# Patient Record
Sex: MALE | Race: BLACK OR AFRICAN AMERICAN | NOT HISPANIC OR LATINO | Employment: UNEMPLOYED | URBAN - METROPOLITAN AREA
[De-identification: names, ages, dates, MRNs, and addresses within clinical notes are randomized per-mention and may not be internally consistent; named-entity substitution may affect disease eponyms.]

---

## 2020-07-14 ENCOUNTER — LAB VISIT (OUTPATIENT)
Dept: PRIMARY CARE CLINIC | Facility: OTHER | Age: 60
End: 2020-07-14
Attending: INTERNAL MEDICINE
Payer: MEDICARE

## 2020-07-14 DIAGNOSIS — Z03.818 ENCOUNTER FOR OBSERVATION FOR SUSPECTED EXPOSURE TO OTHER BIOLOGICAL AGENTS RULED OUT: ICD-10-CM

## 2020-07-14 PROCEDURE — U0003 INFECTIOUS AGENT DETECTION BY NUCLEIC ACID (DNA OR RNA); SEVERE ACUTE RESPIRATORY SYNDROME CORONAVIRUS 2 (SARS-COV-2) (CORONAVIRUS DISEASE [COVID-19]), AMPLIFIED PROBE TECHNIQUE, MAKING USE OF HIGH THROUGHPUT TECHNOLOGIES AS DESCRIBED BY CMS-2020-01-R: HCPCS

## 2020-07-16 ENCOUNTER — HOSPITAL ENCOUNTER (EMERGENCY)
Facility: HOSPITAL | Age: 60
Discharge: HOME | End: 2020-07-17
Attending: EMERGENCY MEDICINE
Payer: MEDICARE

## 2020-07-16 DIAGNOSIS — R05.9 COUGH: ICD-10-CM

## 2020-07-16 DIAGNOSIS — Z20.822 EXPOSURE TO COVID-19 VIRUS: Primary | ICD-10-CM

## 2020-07-16 PROCEDURE — 99283 EMERGENCY DEPT VISIT LOW MDM: CPT | Mod: 25

## 2020-07-16 PROCEDURE — U0002 COVID-19 LAB TEST NON-CDC: HCPCS

## 2020-07-17 VITALS
SYSTOLIC BLOOD PRESSURE: 144 MMHG | HEART RATE: 69 BPM | HEIGHT: 65 IN | BODY MASS INDEX: 27.32 KG/M2 | RESPIRATION RATE: 12 BRPM | WEIGHT: 164 LBS | OXYGEN SATURATION: 99 % | TEMPERATURE: 99 F | DIASTOLIC BLOOD PRESSURE: 82 MMHG

## 2020-07-17 DIAGNOSIS — U07.1 COVID-19 VIRUS DETECTED: ICD-10-CM

## 2020-07-17 LAB — SARS-COV-2 RDRP RESP QL NAA+PROBE: POSITIVE

## 2020-07-17 NOTE — ED PROVIDER NOTES
Encounter Date: 7/16/2020       History     Chief Complaint   Patient presents with    Cough     sent from group home. had positive COVID exposure. had test 2 days ago, pending results. No distress noted to pt.      Ernie Davila, a 59 y.o. male  has a past medical history of Down syndrome, Hypothyroidism, Mental retardation, Multinodular goiter, and Myopia with astigmatism.     He presents to the ED evaluation of increased frequency of cough over the last 2 - 3 days.  Has been exposed to COVID positive patient at group home.  Spoke with nurse on staff and stated that he was noticed to have and increased cough and 1 episode of sweating.  Denies abnormal vitals, specifically of fever and decreased SPO2.        The history is provided by a caregiver.     Review of patient's allergies indicates:  No Known Allergies  Past Medical History:   Diagnosis Date    Down syndrome     Hypothyroidism     Mental retardation     Multinodular goiter     Myopia with astigmatism      History reviewed. No pertinent surgical history.  History reviewed. No pertinent family history.  Social History     Tobacco Use    Smoking status: Never Smoker   Substance Use Topics    Alcohol use: No    Drug use: No     Review of Systems   Unable to perform ROS: Other (baseline MR)       Physical Exam     Initial Vitals [07/16/20 2123]   BP Pulse Resp Temp SpO2   135/72 68 18 98.3 °F (36.8 °C) 100 %      MAP       --         Physical Exam    Nursing note and vitals reviewed.  Constitutional: He appears well-developed and well-nourished.   HENT:   Head: Normocephalic and atraumatic.   Right Ear: External ear normal.   Left Ear: External ear normal.   Nose: Nose normal.   Mouth/Throat: Oropharynx is clear and moist.   Eyes: EOM are normal.   Neck: Normal range of motion. Neck supple.   Cardiovascular: Normal rate and regular rhythm.   Pulmonary/Chest: Breath sounds normal. No respiratory distress.   Abdominal: Soft. He exhibits no distension.  There is no abdominal tenderness. There is no rebound.   Musculoskeletal: Normal range of motion. No tenderness or edema.   Lymphadenopathy:     He has no cervical adenopathy.   Neurological: He is alert and oriented to person, place, and time. No cranial nerve deficit.   Skin: Skin is warm and dry. No rash and no abscess noted. No erythema.   Psychiatric: He has a normal mood and affect. Thought content normal.         ED Course   Procedures  Labs Reviewed   SARS-COV-2 RNA AMPLIFICATION, QUAL - Abnormal; Notable for the following components:       Result Value    SARS-CoV-2 RNA, Amplification, Qual Positive (*)     All other components within normal limits    Narrative:      COVID  critical result(s) called and verbal readback obtained from   Sho FANG  by AM1 07/17/2020 00:10          Imaging Results          X-Ray Chest AP Portable (Final result)  Result time 07/16/20 22:31:52    Final result by Shaina Wilburn MD (07/16/20 22:31:52)                 Impression:      No acute cardiopulmonary process identified.      Electronically signed by: Shaina Wilburn MD  Date:    07/16/2020  Time:    22:31             Narrative:    EXAMINATION:  XR CHEST AP PORTABLE    CLINICAL HISTORY:  Cough    TECHNIQUE:  Single frontal view of the chest was performed.    COMPARISON:  September 2014.    FINDINGS:  Cardiac silhouette is normal in size.  Lungs are symmetrically expanded.  No evidence of focal consolidative process, pneumothorax, or significant pleural effusion.  No acute osseous abnormality identified.                                 Medical Decision Making:   Initial Assessment:   Increased frequency of cough after exposure to COVID positive patient, COVID test pending   Differential Diagnosis:   COVID, PNA, bronchitis   Clinical Tests:   Radiological Study: Ordered and Reviewed  ED Management:  Pt presents to ED for evaluation of increased frequency of cough after exposure to COVID positive patient.  He is able to answer  some yes or no questions, however, given his baseline MR status, his history is limited.  Chest x-ray today shows no acute abnormality.  Transportation was difficult to normal range without COVID status therefore rapid COVID was ordered and he was found to be positive.  He will be transferred back via ambulance to his group home.  Return precautions were given to accepting nurse.                                 Clinical Impression:       ICD-10-CM ICD-9-CM   1. Exposure to Covid-19 Virus  Z20.828    2. Cough  R05 786.2                                Christie Randall PA-C  07/17/20 1734

## 2020-07-17 NOTE — ED NOTES
Unable to obtain appropriate transportation due to pending COVID test vs [facility].  Rapid test order per Dr. Sears.

## 2020-07-18 LAB — SARS-COV-2 RNA RESP QL NAA+PROBE: NOT DETECTED

## 2020-08-04 ENCOUNTER — HOSPITAL ENCOUNTER (INPATIENT)
Facility: HOSPITAL | Age: 60
LOS: 6 days | Discharge: HOME | DRG: 176 | End: 2020-08-10
Attending: EMERGENCY MEDICINE | Admitting: HOSPITALIST
Payer: MEDICARE

## 2020-08-04 DIAGNOSIS — R07.9 CHEST PAIN: ICD-10-CM

## 2020-08-04 DIAGNOSIS — I26.99 PULMONARY EMBOLISM, UNSPECIFIED CHRONICITY, UNSPECIFIED PULMONARY EMBOLISM TYPE, UNSPECIFIED WHETHER ACUTE COR PULMONALE PRESENT: Primary | ICD-10-CM

## 2020-08-04 DIAGNOSIS — R33.9 URINARY RETENTION: ICD-10-CM

## 2020-08-04 PROBLEM — E89.0 POSTABLATIVE HYPOTHYROIDISM: Status: ACTIVE | Noted: 2020-08-04

## 2020-08-04 PROBLEM — U07.1 COVID-19 VIRUS DETECTED: Status: ACTIVE | Noted: 2020-08-04

## 2020-08-04 PROBLEM — R91.8 MULTILOBAR LUNG INFILTRATE: Status: ACTIVE | Noted: 2020-08-04

## 2020-08-04 LAB
ALBUMIN SERPL BCP-MCNC: 2.8 G/DL (ref 3.5–5.2)
ALP SERPL-CCNC: 47 U/L (ref 55–135)
ALT SERPL W/O P-5'-P-CCNC: 16 U/L (ref 10–44)
ANION GAP SERPL CALC-SCNC: 11 MMOL/L (ref 8–16)
AST SERPL-CCNC: 29 U/L (ref 10–40)
BASOPHILS # BLD AUTO: 0.04 K/UL (ref 0–0.2)
BASOPHILS NFR BLD: 0.3 % (ref 0–1.9)
BILIRUB SERPL-MCNC: 0.7 MG/DL (ref 0.1–1)
BNP SERPL-MCNC: <10 PG/ML (ref 0–99)
BUN SERPL-MCNC: 11 MG/DL (ref 8–23)
CALCIUM SERPL-MCNC: 8.4 MG/DL (ref 8.7–10.5)
CHLORIDE SERPL-SCNC: 104 MMOL/L (ref 95–110)
CK SERPL-CCNC: 39 U/L (ref 20–200)
CO2 SERPL-SCNC: 27 MMOL/L (ref 23–29)
CREAT SERPL-MCNC: 0.9 MG/DL (ref 0.5–1.4)
CRP SERPL-MCNC: 156.4 MG/L (ref 0–8.2)
D DIMER PPP IA.FEU-MCNC: 9.36 MG/L FEU
DIFFERENTIAL METHOD: ABNORMAL
EOSINOPHIL # BLD AUTO: 0 K/UL (ref 0–0.5)
EOSINOPHIL NFR BLD: 0 % (ref 0–8)
ERYTHROCYTE [DISTWIDTH] IN BLOOD BY AUTOMATED COUNT: 14.6 % (ref 11.5–14.5)
EST. GFR  (AFRICAN AMERICAN): >60 ML/MIN/1.73 M^2
EST. GFR  (NON AFRICAN AMERICAN): >60 ML/MIN/1.73 M^2
FERRITIN SERPL-MCNC: 1931 NG/ML (ref 20–300)
GLUCOSE SERPL-MCNC: 94 MG/DL (ref 70–110)
HCT VFR BLD AUTO: 43 % (ref 40–54)
HGB BLD-MCNC: 14.5 G/DL (ref 14–18)
IMM GRANULOCYTES # BLD AUTO: 0.12 K/UL (ref 0–0.04)
IMM GRANULOCYTES NFR BLD AUTO: 0.9 % (ref 0–0.5)
LACTATE SERPL-SCNC: 1.2 MMOL/L (ref 0.5–2.2)
LDH SERPL L TO P-CCNC: 540 U/L (ref 110–260)
LYMPHOCYTES # BLD AUTO: 1 K/UL (ref 1–4.8)
LYMPHOCYTES NFR BLD: 7.6 % (ref 18–48)
MCH RBC QN AUTO: 32.2 PG (ref 27–31)
MCHC RBC AUTO-ENTMCNC: 33.7 G/DL (ref 32–36)
MCV RBC AUTO: 96 FL (ref 82–98)
MONOCYTES # BLD AUTO: 1.8 K/UL (ref 0.3–1)
MONOCYTES NFR BLD: 13.2 % (ref 4–15)
NEUTROPHILS # BLD AUTO: 10.3 K/UL (ref 1.8–7.7)
NEUTROPHILS NFR BLD: 78 % (ref 38–73)
NRBC BLD-RTO: 0 /100 WBC
PLATELET # BLD AUTO: 178 K/UL (ref 150–350)
PMV BLD AUTO: 10.7 FL (ref 9.2–12.9)
POTASSIUM SERPL-SCNC: 4.8 MMOL/L (ref 3.5–5.1)
PROCALCITONIN SERPL IA-MCNC: 0.05 NG/ML
PROT SERPL-MCNC: 7.9 G/DL (ref 6–8.4)
RBC # BLD AUTO: 4.5 M/UL (ref 4.6–6.2)
SODIUM SERPL-SCNC: 142 MMOL/L (ref 136–145)
TROPONIN I SERPL DL<=0.01 NG/ML-MCNC: <0.006 NG/ML (ref 0–0.03)
WBC # BLD AUTO: 13.25 K/UL (ref 3.9–12.7)

## 2020-08-04 PROCEDURE — 84484 ASSAY OF TROPONIN QUANT: CPT

## 2020-08-04 PROCEDURE — 25000003 PHARM REV CODE 250: Performed by: HOSPITALIST

## 2020-08-04 PROCEDURE — 83615 LACTATE (LD) (LDH) ENZYME: CPT

## 2020-08-04 PROCEDURE — 83880 ASSAY OF NATRIURETIC PEPTIDE: CPT

## 2020-08-04 PROCEDURE — 11000001 HC ACUTE MED/SURG PRIVATE ROOM

## 2020-08-04 PROCEDURE — 80053 COMPREHEN METABOLIC PANEL: CPT

## 2020-08-04 PROCEDURE — 86140 C-REACTIVE PROTEIN: CPT

## 2020-08-04 PROCEDURE — 93005 ELECTROCARDIOGRAM TRACING: CPT

## 2020-08-04 PROCEDURE — 84145 PROCALCITONIN (PCT): CPT

## 2020-08-04 PROCEDURE — 25500020 PHARM REV CODE 255: Performed by: EMERGENCY MEDICINE

## 2020-08-04 PROCEDURE — 63600175 PHARM REV CODE 636 W HCPCS: Performed by: EMERGENCY MEDICINE

## 2020-08-04 PROCEDURE — 63600175 PHARM REV CODE 636 W HCPCS: Performed by: HOSPITALIST

## 2020-08-04 PROCEDURE — 99291 CRITICAL CARE FIRST HOUR: CPT | Mod: 25

## 2020-08-04 PROCEDURE — 96372 THER/PROPH/DIAG INJ SC/IM: CPT

## 2020-08-04 PROCEDURE — 82728 ASSAY OF FERRITIN: CPT

## 2020-08-04 PROCEDURE — 63700000 PHARM REV CODE 250 ALT 637 W/O HCPCS: Performed by: EMERGENCY MEDICINE

## 2020-08-04 PROCEDURE — 87040 BLOOD CULTURE FOR BACTERIA: CPT

## 2020-08-04 PROCEDURE — 85379 FIBRIN DEGRADATION QUANT: CPT

## 2020-08-04 PROCEDURE — 99292 CRITICAL CARE ADDL 30 MIN: CPT

## 2020-08-04 PROCEDURE — 82550 ASSAY OF CK (CPK): CPT

## 2020-08-04 PROCEDURE — 83605 ASSAY OF LACTIC ACID: CPT

## 2020-08-04 PROCEDURE — 85025 COMPLETE CBC W/AUTO DIFF WBC: CPT

## 2020-08-04 RX ORDER — IBUPROFEN 200 MG
24 TABLET ORAL
Status: DISCONTINUED | OUTPATIENT
Start: 2020-08-04 | End: 2020-08-10 | Stop reason: HOSPADM

## 2020-08-04 RX ORDER — OLOPATADINE HYDROCHLORIDE 1 MG/ML
1 SOLUTION/ DROPS OPHTHALMIC 2 TIMES DAILY
COMMUNITY

## 2020-08-04 RX ORDER — ACETAMINOPHEN 325 MG/1
650 TABLET ORAL EVERY 4 HOURS PRN
Status: DISCONTINUED | OUTPATIENT
Start: 2020-08-04 | End: 2020-08-07

## 2020-08-04 RX ORDER — PANTOPRAZOLE SODIUM 40 MG/1
40 TABLET, DELAYED RELEASE ORAL DAILY
Status: DISCONTINUED | OUTPATIENT
Start: 2020-08-05 | End: 2020-08-10 | Stop reason: HOSPADM

## 2020-08-04 RX ORDER — OLOPATADINE HYDROCHLORIDE 1 MG/ML
1 SOLUTION/ DROPS OPHTHALMIC 2 TIMES DAILY
Status: DISCONTINUED | OUTPATIENT
Start: 2020-08-04 | End: 2020-08-10 | Stop reason: HOSPADM

## 2020-08-04 RX ORDER — IBUPROFEN 200 MG
16 TABLET ORAL
Status: DISCONTINUED | OUTPATIENT
Start: 2020-08-04 | End: 2020-08-10 | Stop reason: HOSPADM

## 2020-08-04 RX ORDER — SODIUM CHLORIDE 0.9 % (FLUSH) 0.9 %
3 SYRINGE (ML) INJECTION
Status: DISCONTINUED | OUTPATIENT
Start: 2020-08-04 | End: 2020-08-10 | Stop reason: HOSPADM

## 2020-08-04 RX ORDER — OMEPRAZOLE 20 MG/1
20 CAPSULE, DELAYED RELEASE ORAL DAILY
COMMUNITY

## 2020-08-04 RX ORDER — ACETAMINOPHEN 500 MG
1000 TABLET ORAL EVERY 8 HOURS PRN
Status: DISCONTINUED | OUTPATIENT
Start: 2020-08-04 | End: 2020-08-10 | Stop reason: HOSPADM

## 2020-08-04 RX ORDER — ENOXAPARIN SODIUM 100 MG/ML
1 INJECTION SUBCUTANEOUS
Status: DISCONTINUED | OUTPATIENT
Start: 2020-08-05 | End: 2020-08-09

## 2020-08-04 RX ORDER — POLYETHYLENE GLYCOL 3350 17 G/17G
17 POWDER, FOR SOLUTION ORAL DAILY
Status: DISCONTINUED | OUTPATIENT
Start: 2020-08-04 | End: 2020-08-10 | Stop reason: HOSPADM

## 2020-08-04 RX ORDER — AZITHROMYCIN 250 MG/1
500 TABLET, FILM COATED ORAL ONCE
Status: COMPLETED | OUTPATIENT
Start: 2020-08-04 | End: 2020-08-04

## 2020-08-04 RX ORDER — MULTIVITAMIN
1 TABLET ORAL DAILY
COMMUNITY

## 2020-08-04 RX ORDER — ASCORBIC ACID 500 MG
500 TABLET ORAL 2 TIMES DAILY
Status: DISCONTINUED | OUTPATIENT
Start: 2020-08-04 | End: 2020-08-10 | Stop reason: HOSPADM

## 2020-08-04 RX ORDER — CALCIUM CARBONATE 200(500)MG
500 TABLET,CHEWABLE ORAL DAILY
Status: DISCONTINUED | OUTPATIENT
Start: 2020-08-05 | End: 2020-08-10 | Stop reason: HOSPADM

## 2020-08-04 RX ORDER — TALC
6 POWDER (GRAM) TOPICAL NIGHTLY PRN
Status: DISCONTINUED | OUTPATIENT
Start: 2020-08-04 | End: 2020-08-10 | Stop reason: HOSPADM

## 2020-08-04 RX ORDER — GLUCAGON 1 MG
1 KIT INJECTION
Status: DISCONTINUED | OUTPATIENT
Start: 2020-08-04 | End: 2020-08-10 | Stop reason: HOSPADM

## 2020-08-04 RX ORDER — LEVOTHYROXINE SODIUM 75 UG/1
75 TABLET ORAL DAILY
Status: DISCONTINUED | OUTPATIENT
Start: 2020-08-05 | End: 2020-08-10 | Stop reason: HOSPADM

## 2020-08-04 RX ORDER — ASCORBIC ACID 500 MG
500 TABLET ORAL 2 TIMES DAILY
COMMUNITY

## 2020-08-04 RX ORDER — AZITHROMYCIN 250 MG/1
250 TABLET, FILM COATED ORAL DAILY
Status: COMPLETED | OUTPATIENT
Start: 2020-08-05 | End: 2020-08-08

## 2020-08-04 RX ORDER — ENOXAPARIN SODIUM 100 MG/ML
80 INJECTION SUBCUTANEOUS
Status: COMPLETED | OUTPATIENT
Start: 2020-08-04 | End: 2020-08-04

## 2020-08-04 RX ADMIN — AZITHROMYCIN MONOHYDRATE 500 MG: 250 TABLET ORAL at 02:08

## 2020-08-04 RX ADMIN — ENOXAPARIN SODIUM 80 MG: 100 INJECTION SUBCUTANEOUS at 12:08

## 2020-08-04 RX ADMIN — POLYETHYLENE GLYCOL (3350) 17 G: 17 POWDER, FOR SOLUTION ORAL at 01:08

## 2020-08-04 RX ADMIN — IOHEXOL 100 ML: 350 INJECTION, SOLUTION INTRAVENOUS at 11:08

## 2020-08-04 RX ADMIN — OXYCODONE HYDROCHLORIDE AND ACETAMINOPHEN 500 MG: 500 TABLET ORAL at 08:08

## 2020-08-04 RX ADMIN — OLOPATADINE HYDROCHLORIDE 1 DROP: 1 SOLUTION/ DROPS OPHTHALMIC at 08:08

## 2020-08-04 RX ADMIN — CEFTRIAXONE 1 G: 1 INJECTION, SOLUTION INTRAVENOUS at 02:08

## 2020-08-04 NOTE — PLAN OF CARE
VN cued into room to complete admit assessment. Pt has MR and has limited ability to answer questions. Pt from group home. Unable to verify home medications at this time. Call light within reach, 2x bed rails. Patient notified to ask staff for assistance and pt verbalized complete understanding. Time allowed for questions. Will continue to monitor and intervene as needed.

## 2020-08-04 NOTE — ED PROVIDER NOTES
Encounter Date: 8/4/2020       History     Chief Complaint   Patient presents with    COVID-19 Concerns     COVID positive on 7/18 with fever, low O2 saturations, low BP per EMS. Patient is from [facility] - [address] [place] [address].      Patient is a 59-year-old male brought in by EMS from his group home for evaluation of shortness of breath and fever.  Patient had a positive COVID-19 swab done 3 weeks ago.  He was then sent to the [facility].  He has reportedly been symptom free over the past 3 days and was therefore sent back to his group home yesterday.  He presents to the ED this morning with a complaint of abdominal pain.  Patient is afebrile on presentation to the ED.        Review of patient's allergies indicates:  No Known Allergies  Past Medical History:   Diagnosis Date    Down syndrome     Hypothyroidism     Mental retardation     Multinodular goiter     Myopia with astigmatism      History reviewed. No pertinent surgical history.  History reviewed. No pertinent family history.  Social History     Tobacco Use    Smoking status: Never Smoker   Substance Use Topics    Alcohol use: No    Drug use: No     Review of Systems   Constitutional: Positive for fever.   Cardiovascular: Negative for chest pain.   Gastrointestinal: Positive for abdominal pain. Negative for vomiting.   All other systems reviewed and are negative.      Physical Exam     Initial Vitals   BP Pulse Resp Temp SpO2   -- -- -- -- --      MAP       --         Physical Exam    Nursing note and vitals reviewed.  Constitutional: No distress.   HENT:   Head: Atraumatic.   Eyes: EOM are normal.   Neck: Neck supple.   Cardiovascular: Normal rate, regular rhythm and normal heart sounds.   Pulmonary/Chest:   Diminished breath sounds at bases bilaterally. (Poor inspiratory effort.)   Abdominal: Soft. He exhibits no distension. There is no abdominal tenderness.   Musculoskeletal: Normal range of motion. No edema.    Neurological: He is alert.   Skin: Skin is warm and dry.         ED Course   Critical Care    Date/Time: 8/4/2020 12:37 PM  Performed by: Chung Franklin MD  Authorized by: Chung Franklin MD   Direct patient critical care time: 30 minutes  Additional history critical care time: 10 minutes  Ordering / reviewing critical care time: 15 minutes  Documentation critical care time: 15 minutes  Consulting other physicians critical care time: 5 minutes  Total critical care time (exclusive of procedural time) : 75 minutes  Critical care was time spent personally by me on the following activities: examination of patient, ordering and performing treatments and interventions, ordering and review of radiographic studies, re-evaluation of patient's condition, review of old charts, pulse oximetry, ordering and review of laboratory studies, obtaining history from patient or surrogate, evaluation of patient's response to treatment, discussions with primary provider and discussions with consultants.        Labs Reviewed   CBC W/ AUTO DIFFERENTIAL - Abnormal; Notable for the following components:       Result Value    WBC 13.25 (*)     RBC 4.50 (*)     Mean Corpuscular Hemoglobin 32.2 (*)     RDW 14.6 (*)     Immature Granulocytes 0.9 (*)     Gran # (ANC) 10.3 (*)     Immature Grans (Abs) 0.12 (*)     Mono # 1.8 (*)     Gran% 78.0 (*)     Lymph% 7.6 (*)     All other components within normal limits   COMPREHENSIVE METABOLIC PANEL - Abnormal; Notable for the following components:    Calcium 8.4 (*)     Albumin 2.8 (*)     Alkaline Phosphatase 47 (*)     All other components within normal limits   C-REACTIVE PROTEIN - Abnormal; Notable for the following components:    .4 (*)     All other components within normal limits   FERRITIN - Abnormal; Notable for the following components:    Ferritin 1,931 (*)     All other components within normal limits   LACTATE DEHYDROGENASE - Abnormal; Notable for the following  components:     (*)     All other components within normal limits   D DIMER, QUANTITATIVE - Abnormal; Notable for the following components:    D-Dimer 9.36 (*)     All other components within normal limits   CULTURE, BLOOD   CULTURE, BLOOD   CK   LACTIC ACID, PLASMA   TROPONIN I   PROCALCITONIN   B-TYPE NATRIURETIC PEPTIDE     EKG Readings: (Independently Interpreted)   Initial Reading: No STEMI. Heart Rate: 93. ST Segments: Normal ST Segments. Q Waves: III and AVF.       Imaging Results          CTA Chest Non-Coronary (PE Study) (Final result)  Result time 08/04/20 12:04:46    Final result by Jeannette Gomes MD (08/04/20 12:04:46)                 Impression:      Pulmonary emboli involving the right main pulmonary artery, right lower lobe pulmonary arteries and left lower lobe subsegmental pulmonary arteries.  No evidence right heart strain.    Constellation pulmonary findings may be due to multifocal infection, aspiration and/or developing infarct.    Epic abnormal flag.    COMMUNICATION  This critical result was discovered/received at 12:00 .  The critical information above was relayed directly by me by telephone to Dr. Chung Franklin on 08/04/2020 at 12:04.      Electronically signed by: Jeannette Gomes  Date:    08/04/2020  Time:    12:04             Narrative:    EXAMINATION:  CTA CHEST NON CORONARY    CLINICAL HISTORY:  PE suspected, high pretest prob;    TECHNIQUE:  Low dose axial images, sagittal and coronal reformations were obtained from the thoracic inlet to the lung bases following the IV administration of 100 mL of Omnipaque 350.  Contrast timing was optimized to evaluate the pulmonary arteries.  MIP images were performed.    COMPARISON:  Multiple prior chest radiographs, most recent dated earlier same day    FINDINGS:  Normal right thyroid gland.  Heart size is normal.  No pericardial effusion.    The thoracic aorta is normal in caliber. No bowing of the intraventricular septum. No enlarged  "axillary, mediastinal or hilar nodes.    Filling defect within the right main pulmonary artery extending into the right lower lobe pulmonary arteries.  Filling defect within the left lower lobe subsegmental pulmonary arteries.  Main pulmonary artery is not enlarged.    Bilateral lower lobe bronchial wall thickening.  Right lower lobe mucous plugging.  Right lower lobe and to a lesser extent involving the left upper and lower lobes mixed consolidation and ground-glass opacities.  No pleural effusion or pneumothorax.    Imaged upper abdomen is unremarkable.  Gynecomastia.  No acute or aggressive osseous abnormality.                               X-Ray Chest AP Portable (Final result)  Result time 08/04/20 10:19:32    Final result by Yovani Loo MD (08/04/20 10:19:32)                 Impression:      Patchy increased attenuation in the mid and lower lung zones suggestive of nonspecific infectious or inflammatory process in the setting of COVID-19.  Other considerations include mild pulmonary edema or sequela of aspiration.      Electronically signed by: Yovani Loo MD  Date:    08/04/2020  Time:    10:19             Narrative:    EXAMINATION:  XR CHEST AP PORTABLE    CLINICAL HISTORY:  Provided history is "Suspected Covid-19 Virus Infection;  ".    TECHNIQUE:  One view of the chest.    COMPARISON:  07/16/2020.    FINDINGS:  Cardiac wires overlie the chest.  Patient is slightly rotated with exaggerated kyphotic positioning.  New increased interstitial and parenchymal attenuation in the lower lung zones.  Possible trace bilateral pleural effusions.  Upper lung fields are relatively clear.  No pneumothorax.                                 Medical Decision Making:   Clinical Tests:   Lab Tests: Ordered and Reviewed  Radiological Study: Ordered and Reviewed  ED Management:  59-year-old male who had a positive COVID-19 swab done a couple of weeks ago now presenting with shortness of breath.  CTA of chest shows " pulmonary emboli and probable infiltrates.  Patient was given Lovenox here in the ED and will be admitted by the [facility] hospitalist.                   ED Course as of Aug 04 1236   Tue Aug 04, 2020   1218 Discussed patient with Dr. Elliott, critical care fellow who agrees with starting patient on Lovenox. Awaiting call back from [facility] hospitalist to admit.     [MF]   1236 Discussed patient with Dr. Strauss who will admit the patient.     [MF]      ED Course User Index  [MF] Kika Suggs                Clinical Impression:       ICD-10-CM ICD-9-CM   1. Pulmonary embolism, unspecified chronicity, unspecified pulmonary embolism type, unspecified whether acute cor pulmonale present  I26.99 415.19                                Chung Franklin MD  08/04/20 1512

## 2020-08-04 NOTE — PROGRESS NOTES
IV azithromycin currently restricted to pre-operative antimicrobial prophylaxis and patients requiring mechanical ventilation. Order converted to oral azithromycin (dose, frequency, and duration unchanged) per [facility].

## 2020-08-04 NOTE — ED NOTES
Presents to ED via EMS from group home with SOB. EMS report pt returned from [facility] to group home with SOB and lethargic. Pt alert oriented to first name only hx of MR and Down Syndrome. Unable to complete Abuse/Fall and Suicide assessment.  Denies SOB or chest pain. Pt does winces when abdomen is palpated in lower quadrant. O2 stat on room air 95%. No acute distress.

## 2020-08-05 LAB
ALBUMIN SERPL BCP-MCNC: 2.4 G/DL (ref 3.5–5.2)
ALP SERPL-CCNC: 55 U/L (ref 55–135)
ALT SERPL W/O P-5'-P-CCNC: 25 U/L (ref 10–44)
ANION GAP SERPL CALC-SCNC: 12 MMOL/L (ref 8–16)
AST SERPL-CCNC: 34 U/L (ref 10–40)
BASOPHILS # BLD AUTO: 0.04 K/UL (ref 0–0.2)
BASOPHILS NFR BLD: 0.2 % (ref 0–1.9)
BILIRUB SERPL-MCNC: 0.8 MG/DL (ref 0.1–1)
BUN SERPL-MCNC: 15 MG/DL (ref 8–23)
CALCIUM SERPL-MCNC: 9.1 MG/DL (ref 8.7–10.5)
CHLORIDE SERPL-SCNC: 103 MMOL/L (ref 95–110)
CO2 SERPL-SCNC: 28 MMOL/L (ref 23–29)
CREAT SERPL-MCNC: 1.1 MG/DL (ref 0.5–1.4)
DIFFERENTIAL METHOD: ABNORMAL
EOSINOPHIL # BLD AUTO: 0 K/UL (ref 0–0.5)
EOSINOPHIL NFR BLD: 0 % (ref 0–8)
ERYTHROCYTE [DISTWIDTH] IN BLOOD BY AUTOMATED COUNT: 14.8 % (ref 11.5–14.5)
EST. GFR  (AFRICAN AMERICAN): >60 ML/MIN/1.73 M^2
EST. GFR  (NON AFRICAN AMERICAN): >60 ML/MIN/1.73 M^2
GLUCOSE SERPL-MCNC: 102 MG/DL (ref 70–110)
HCT VFR BLD AUTO: 41.6 % (ref 40–54)
HGB BLD-MCNC: 14 G/DL (ref 14–18)
IMM GRANULOCYTES # BLD AUTO: 0.19 K/UL (ref 0–0.04)
IMM GRANULOCYTES NFR BLD AUTO: 0.9 % (ref 0–0.5)
LYMPHOCYTES # BLD AUTO: 1.9 K/UL (ref 1–4.8)
LYMPHOCYTES NFR BLD: 9 % (ref 18–48)
MAGNESIUM SERPL-MCNC: 2.3 MG/DL (ref 1.6–2.6)
MCH RBC QN AUTO: 31.7 PG (ref 27–31)
MCHC RBC AUTO-ENTMCNC: 33.7 G/DL (ref 32–36)
MCV RBC AUTO: 94 FL (ref 82–98)
MONOCYTES # BLD AUTO: 3.4 K/UL (ref 0.3–1)
MONOCYTES NFR BLD: 16.1 % (ref 4–15)
NEUTROPHILS # BLD AUTO: 15.6 K/UL (ref 1.8–7.7)
NEUTROPHILS NFR BLD: 73.8 % (ref 38–73)
NRBC BLD-RTO: 0 /100 WBC
PLATELET # BLD AUTO: 179 K/UL (ref 150–350)
PLATELET BLD QL SMEAR: ABNORMAL
PMV BLD AUTO: 11.2 FL (ref 9.2–12.9)
POTASSIUM SERPL-SCNC: 4.2 MMOL/L (ref 3.5–5.1)
PROT SERPL-MCNC: 8.1 G/DL (ref 6–8.4)
RBC # BLD AUTO: 4.42 M/UL (ref 4.6–6.2)
SODIUM SERPL-SCNC: 143 MMOL/L (ref 136–145)
WBC # BLD AUTO: 21.12 K/UL (ref 3.9–12.7)

## 2020-08-05 PROCEDURE — 80053 COMPREHEN METABOLIC PANEL: CPT

## 2020-08-05 PROCEDURE — 85025 COMPLETE CBC W/AUTO DIFF WBC: CPT

## 2020-08-05 PROCEDURE — 63700000 PHARM REV CODE 250 ALT 637 W/O HCPCS: Performed by: HOSPITALIST

## 2020-08-05 PROCEDURE — 83735 ASSAY OF MAGNESIUM: CPT

## 2020-08-05 PROCEDURE — 63600175 PHARM REV CODE 636 W HCPCS: Performed by: HOSPITALIST

## 2020-08-05 PROCEDURE — 25000003 PHARM REV CODE 250: Performed by: HOSPITALIST

## 2020-08-05 PROCEDURE — 36415 COLL VENOUS BLD VENIPUNCTURE: CPT

## 2020-08-05 PROCEDURE — 94761 N-INVAS EAR/PLS OXIMETRY MLT: CPT

## 2020-08-05 PROCEDURE — 11000001 HC ACUTE MED/SURG PRIVATE ROOM

## 2020-08-05 RX ORDER — ATORVASTATIN CALCIUM 40 MG/1
40 TABLET, FILM COATED ORAL NIGHTLY
Status: DISCONTINUED | OUTPATIENT
Start: 2020-08-05 | End: 2020-08-10 | Stop reason: HOSPADM

## 2020-08-05 RX ADMIN — OXYCODONE HYDROCHLORIDE AND ACETAMINOPHEN 500 MG: 500 TABLET ORAL at 09:08

## 2020-08-05 RX ADMIN — OLOPATADINE HYDROCHLORIDE 1 DROP: 1 SOLUTION/ DROPS OPHTHALMIC at 09:08

## 2020-08-05 RX ADMIN — ENOXAPARIN SODIUM 70 MG: 100 INJECTION SUBCUTANEOUS at 01:08

## 2020-08-05 RX ADMIN — AZITHROMYCIN 250 MG: 250 TABLET, FILM COATED ORAL at 09:08

## 2020-08-05 RX ADMIN — LEVOTHYROXINE SODIUM 75 MCG: 75 TABLET ORAL at 09:08

## 2020-08-05 RX ADMIN — PANTOPRAZOLE SODIUM 40 MG: 40 TABLET, DELAYED RELEASE ORAL at 09:08

## 2020-08-05 RX ADMIN — ATORVASTATIN CALCIUM 40 MG: 40 TABLET, FILM COATED ORAL at 10:08

## 2020-08-05 RX ADMIN — THERA TABS 1 TABLET: TAB at 09:08

## 2020-08-05 RX ADMIN — CALCIUM CARBONATE (ANTACID) CHEW TAB 500 MG 500 MG: 500 CHEW TAB at 09:08

## 2020-08-05 RX ADMIN — OLOPATADINE HYDROCHLORIDE 1 DROP: 1 SOLUTION/ DROPS OPHTHALMIC at 10:08

## 2020-08-05 RX ADMIN — POLYETHYLENE GLYCOL (3350) 17 G: 17 POWDER, FOR SOLUTION ORAL at 09:08

## 2020-08-05 RX ADMIN — OXYCODONE HYDROCHLORIDE AND ACETAMINOPHEN 500 MG: 500 TABLET ORAL at 10:08

## 2020-08-05 RX ADMIN — ENOXAPARIN SODIUM 70 MG: 100 INJECTION SUBCUTANEOUS at 12:08

## 2020-08-05 RX ADMIN — CEFTRIAXONE 1 G: 1 INJECTION, SOLUTION INTRAVENOUS at 02:08

## 2020-08-05 RX ADMIN — ENOXAPARIN SODIUM 70 MG: 100 INJECTION SUBCUTANEOUS at 10:08

## 2020-08-05 RX ADMIN — ACETAMINOPHEN 650 MG: 325 TABLET ORAL at 05:08

## 2020-08-05 NOTE — PLAN OF CARE
Plan of care reviewed with patient.Pt AAO. Pts sleep slurred because he has no teeth and his mouth is dry.Encourage pt to drink more fluids. Appetite poor. Pt needs encouragement with meals. Continues on IV ABT with 0 ase noted. Denies pain when asked.  Verbalizes to staff understanding. NSR on monitor with 0 red alarms noted.  No acute distress observed at this time. Side rails x2, bed in low position, call bell within reach. Bed alarm in place for patient safety. Will relay to oncoming nurse to monitor for changes in condition.

## 2020-08-05 NOTE — HPI
Mr. Davila is a 60yo man with Down syndrome and hypothyroidism who lives in a group home who presented to the ED with shortness of breath and fever. He was recently diagnosed with COVID on 7/16/20 and was quarantined at the [facility]. He returned to care home after resolution of symptoms, but on day of admission he again developed fever and shortness of breath. To me, he denies any shortness of breath, chest discomfort, cough, fevers, chills, GI symptoms. States that he feels basically normal at this time.    In the ED, he had temp 100.4, p 100, /74. WBC 13, ferritin 1931, D-dimer 9.36, , trop and BNP wnl, procal negative. CTA with bilateral pulmonary emboli and GGO concerning for infarct vs infection.

## 2020-08-05 NOTE — ASSESSMENT & PLAN NOTE
- detected on CT scan  - initiated on therapeutic lovenox  - can likely transition to DOAC if remains stable off O2  - no evidence of right heart strain  - check LIZBETH BERMEO

## 2020-08-05 NOTE — SUBJECTIVE & OBJECTIVE
"Interval History: Seen via [employer] telemedicine platform: I was available outside the room calling from computer with headphones. Does report fever but denies any shortness of breath, chest pain, cough. States he feels "fine" now.    Review of Systems   Constitutional: Positive for fever.   Respiratory: Negative for cough and shortness of breath.    Cardiovascular: Negative for chest pain and leg swelling.   Gastrointestinal: Negative for abdominal pain, diarrhea, nausea and vomiting.   Neurological: Negative for weakness.     Objective:     Vital Signs (Most Recent):  Temp: 99.7 °F (37.6 °C) (08/05/20 0745)  Pulse: 86 (08/05/20 1200)  Resp: 18 (08/05/20 0745)  BP: (!) 101/58 (08/05/20 0745)  SpO2: (!) 93 % (08/05/20 0845) Vital Signs (24h Range):  Temp:  [97.8 °F (36.6 °C)-101.3 °F (38.5 °C)] 99.7 °F (37.6 °C)  Pulse:  [] 86  Resp:  [18-20] 18  SpO2:  [91 %-100 %] 93 %  BP: (101-139)/(58-78) 101/58     Weight: 62.4 kg (137 lb 9.1 oz)  Body mass index is 22.89 kg/m².  No intake or output data in the 24 hours ending 08/05/20 1458   Physical Exam  Vitals signs reviewed.   Constitutional:       General: He is not in acute distress.     Appearance: He is well-developed. He is not diaphoretic.   HENT:      Head: Atraumatic.      Comments: Down facies     Nose: Nose normal.   Eyes:      General: No scleral icterus.     Pupils: Pupils are equal, round, and reactive to light.   Neck:      Musculoskeletal: Normal range of motion.      Vascular: No JVD.      Trachea: No tracheal deviation.   Pulmonary:      Effort: Pulmonary effort is normal. No respiratory distress.   Abdominal:      General: Abdomen is flat. There is no distension.   Musculoskeletal:         General: No deformity.   Skin:     Findings: No rash.   Neurological:      Mental Status: He is alert and oriented to person, place, and time.   Psychiatric:         Behavior: Behavior normal.         Significant Labs: All pertinent labs within the past 24 hours " have been reviewed.    Significant Imaging: I have reviewed all pertinent imaging results/findings within the past 24 hours.

## 2020-08-05 NOTE — PLAN OF CARE
VN rounds: VN cued into pt's room. Pt resting in bed, awake but did not respond to VN. Respirations even and unlabored. NAD noted.     Patient's progress notes, labs, and care plan reviewed. Will cont to be available as needed.

## 2020-08-05 NOTE — ASSESSMENT & PLAN NOTE
- likely infarction due to PE, but possibility remains that this could represent post-viral CAP  - initiate on rocephin/azithromycin, will continue for now given continued fever

## 2020-08-05 NOTE — PROGRESS NOTES
"[facility]  Progress Note    Patient Name: Ernie Davila  MRN: [id number]  Patient Class: IP- Inpatient   Admission Date: 8/4/2020  Length of Stay: 1 days  Attending Physician: Jude Strauss, *  Primary Care Provider: To Obtain Unable        Subjective:     Principal Problem:Acute pulmonary embolism without acute cor pulmonale        HPI:  Mr. Davila is a 60yo man with Down syndrome and hypothyroidism who lives in a group home who presented to the ED with shortness of breath and fever. He was recently diagnosed with COVID on 7/16/20 and was quarantined at the [facility]. He returned to senior living after resolution of symptoms, but on day of admission he again developed fever and shortness of breath. To me, he denies any shortness of breath, chest discomfort, cough, fevers, chills, GI symptoms. States that he feels basically normal at this time.    In the ED, he had temp 100.4, p 100, /74. WBC 13, ferritin 1931, D-dimer 9.36, , trop and BNP wnl, procal negative. CTA with bilateral pulmonary emboli and GGO concerning for infarct vs infection.    Overview/Hospital Course:  No notes on file    Interval History: Seen via [employer] telemedicine platform: I was available outside the room calling from computer with headphones. Does report fever but denies any shortness of breath, chest pain, cough. States he feels "fine" now.    Review of Systems   Constitutional: Positive for fever.   Respiratory: Negative for cough and shortness of breath.    Cardiovascular: Negative for chest pain and leg swelling.   Gastrointestinal: Negative for abdominal pain, diarrhea, nausea and vomiting.   Neurological: Negative for weakness.     Objective:     Vital Signs (Most Recent):  Temp: 99.7 °F (37.6 °C) (08/05/20 0745)  Pulse: 86 (08/05/20 1200)  Resp: 18 (08/05/20 0745)  BP: (!) 101/58 (08/05/20 0745)  SpO2: (!) 93 % (08/05/20 0845) Vital Signs (24h Range):  Temp:  [97.8 °F (36.6 " °C)-101.3 °F (38.5 °C)] 99.7 °F (37.6 °C)  Pulse:  [] 86  Resp:  [18-20] 18  SpO2:  [91 %-100 %] 93 %  BP: (101-139)/(58-78) 101/58     Weight: 62.4 kg (137 lb 9.1 oz)  Body mass index is 22.89 kg/m².  No intake or output data in the 24 hours ending 08/05/20 1458   Physical Exam  Vitals signs reviewed.   Constitutional:       General: He is not in acute distress.     Appearance: He is well-developed. He is not diaphoretic.   HENT:      Head: Atraumatic.      Comments: Down facies     Nose: Nose normal.   Eyes:      General: No scleral icterus.     Pupils: Pupils are equal, round, and reactive to light.   Neck:      Musculoskeletal: Normal range of motion.      Vascular: No JVD.      Trachea: No tracheal deviation.   Pulmonary:      Effort: Pulmonary effort is normal. No respiratory distress.   Abdominal:      General: Abdomen is flat. There is no distension.   Musculoskeletal:         General: No deformity.   Skin:     Findings: No rash.   Neurological:      Mental Status: He is alert and oriented to person, place, and time.   Psychiatric:         Behavior: Behavior normal.         Significant Labs: All pertinent labs within the past 24 hours have been reviewed.    Significant Imaging: I have reviewed all pertinent imaging results/findings within the past 24 hours.      Assessment/Plan:      * Acute pulmonary embolism without acute cor pulmonale  - detected on CT scan  - initiated on therapeutic lovenox  - can likely transition to DOAC if remains stable off O2  - no evidence of right heart strain  - check [facility]    Postablative hypothyroidism  - continue home synthroid      Multilobar lung infiltrate  - likely infarction due to PE, but possibility remains that this could represent post-viral CAP  - initiate on rocephin/azithromycin, will continue for now given continued fever      COVID-19 virus detected  - positive test on 7/16/20  - currently appears to be asymptomatic and possible that symptoms are due to  PE  - defer advanced therapies for COVID for now given lack of hypoxia or other symptoms    Down syndrome  - will return to group home upon discharge        VTE Risk Mitigation (From admission, onward)         Ordered     enoxaparin injection 70 mg  Every 12 hours (non-standard times)      08/04/20 1915                      Jude Strauss MD  Department of Hospital Medicine   [facility]

## 2020-08-05 NOTE — SUBJECTIVE & OBJECTIVE
Past Medical History:   Diagnosis Date    Down syndrome     Hypothyroidism     Mental retardation     Multinodular goiter     Myopia with astigmatism        History reviewed. No pertinent surgical history.    Review of patient's allergies indicates:  No Known Allergies    No current facility-administered medications on file prior to encounter.      Current Outpatient Medications on File Prior to Encounter   Medication Sig    ascorbic acid, vitamin C, (VITAMIN C) 500 MG tablet Take 500 mg by mouth 2 (two) times daily.    multivitamin (THERAGRAN) per tablet Take 1 tablet by mouth once daily.    olopatadine (PATANOL) 0.1 % ophthalmic solution 1 drop 2 (two) times daily.    omeprazole (PRILOSEC) 20 MG capsule Take 20 mg by mouth once daily.    calcium carbonate (OS-KARLI) 600 mg (1,500 mg) Tab Take 600 mg by mouth 2 (two) times daily with meals.    levothyroxine (SYNTHROID) 50 MCG tablet Take 75 mcg by mouth once daily.     ranitidine (ZANTAC) 15 mg/mL syrup Take 5 mLs (75 mg total) by mouth 2 (two) times daily as needed for Heartburn.    [DISCONTINUED] ammonium lactate 12 % Crea Apply topically.    [DISCONTINUED] urea (CARMOL) 40 % Crea Apply topically 2 (two) times daily.     Family History     Problem Relation (Age of Onset)    Asthma Brother        Tobacco Use    Smoking status: Never Smoker   Substance and Sexual Activity    Alcohol use: No    Drug use: No    Sexual activity: Never     Review of Systems   Constitutional: Positive for fever. Negative for chills and diaphoresis.   HENT: Negative for congestion and sore throat.    Eyes: Negative for discharge and visual disturbance.   Respiratory: Positive for shortness of breath. Negative for cough.    Cardiovascular: Negative for chest pain and leg swelling.   Gastrointestinal: Negative for abdominal pain, nausea and vomiting.   Genitourinary: Negative for dysuria and flank pain.   Musculoskeletal: Negative for arthralgias and joint swelling.   Skin:  Negative for rash and wound.   Allergic/Immunologic: Negative for immunocompromised state.   Neurological: Negative for light-headedness and headaches.   Psychiatric/Behavioral: Negative for agitation and confusion.     Objective:     Vital Signs (Most Recent):  Temp: 98.3 °F (36.8 °C) (08/04/20 1755)  Pulse: 98 (08/04/20 1801)  Resp: 18 (08/04/20 1755)  BP: 123/76 (08/04/20 1755)  SpO2: 100 % (08/04/20 1755) Vital Signs (24h Range):  Temp:  [98.3 °F (36.8 °C)-100.4 °F (38 °C)] 98.3 °F (36.8 °C)  Pulse:  [] 98  Resp:  [18-20] 18  SpO2:  [91 %-100 %] 100 %  BP: (121-139)/(74-82) 123/76     Weight: 74.4 kg (164 lb)  Body mass index is 27.29 kg/m².    Physical Exam  Vitals signs reviewed.   Constitutional:       General: He is not in acute distress.     Appearance: He is well-developed. He is not diaphoretic.   HENT:      Head: Atraumatic.      Comments: Down facies     Nose: Nose normal.   Eyes:      General: No scleral icterus.     Pupils: Pupils are equal, round, and reactive to light.   Neck:      Musculoskeletal: Normal range of motion.      Vascular: No JVD.      Trachea: No tracheal deviation.   Cardiovascular:      Rate and Rhythm: Normal rate and regular rhythm.   Pulmonary:      Effort: Pulmonary effort is normal. No respiratory distress.   Abdominal:      General: There is no distension.      Palpations: Abdomen is soft. There is no mass.      Tenderness: There is no abdominal tenderness.      Hernia: No hernia is present.   Musculoskeletal:         General: No deformity.   Skin:     General: Skin is warm and dry.      Findings: No rash.   Neurological:      Mental Status: He is alert and oriented to person, place, and time.   Psychiatric:         Behavior: Behavior normal.           CRANIAL NERVES     CN III, IV, VI   Pupils are equal, round, and reactive to light.       Significant Labs: All pertinent labs within the past 24 hours have been reviewed.    Significant Imaging: I have reviewed all  pertinent imaging results/findings within the past 24 hours.

## 2020-08-05 NOTE — ASSESSMENT & PLAN NOTE
- positive test on 7/16/20  - currently appears to be asymptomatic and possible that symptoms are due to PE  - defer advanced therapies for COVID for now given lack of hypoxia or other symptoms

## 2020-08-05 NOTE — PLAN OF CARE
CM continues to attempt contact with group home staff for accurate d/c assessment.       08/05/20 8643   Discharge Assessment   Assessment Type Discharge Planning Assessment   Assessment information obtained from? Medical Record   Current cognitive status: Not Oriented to Place;Not Oriented to Time   Current Functional Status:   (unable to assess)   Lives With facility resident

## 2020-08-05 NOTE — PLAN OF CARE
CM has made multiple attempts to contact  [place] with no success.  Unable to leave message as phone seems to be out of service.  CM contacted another  in [place] who dircted to 3 different sites with no success. Attempted call home number in pt's chart with no answer.  Pt is unable to provide information needed for assessment.  CM will continue to attempt contact.    Margoth Tovar, RN    [phone]

## 2020-08-05 NOTE — PLAN OF CARE
Care plan explained. NSR on Tele. -120, sustaining at 120s,  HARJINDER Melendez NP informed. Temp this am, 101.3F, Tylenol given.  On RA, Sats 95%.  Meds given as per MAR. Safety maintained at all times. Call bell within hand. Bed alarm on. Contact, Droplet, Respiratory precautions maintained. Will continue to monitor.

## 2020-08-05 NOTE — ASSESSMENT & PLAN NOTE
- likely infarction due to PE, but possibility remains that this could represent post-viral CAP  - initiate on rocephin/azithromycin for now  - check procal and de-escalate rapidly

## 2020-08-05 NOTE — H&P
[facility] Hospital Medicine  History & Physical    Patient Name: Ernie Davila  MRN: [id number]  Admission Date: 8/4/2020  Attending Physician: Jude Strauss MD  Primary Care Provider: To Obtain Unable         Patient information was obtained from patient, past medical records and ER records.     Subjective:     Principal Problem:Acute pulmonary embolism without acute cor pulmonale    Chief Complaint:   Chief Complaint   Patient presents with    COVID-19 Concerns     COVID positive on 7/18 with fever, low O2 saturations, low BP per EMS. Patient is from long term - [address] [place] [address]. Discharged from [facility] yesterday.         HPI: Mr. Davila is a 58yo man with Down syndrome and hypothyroidism who lives in a group home who presented to the ED with shortness of breath and fever. He was recently diagnosed with COVID on 7/16/20 and was quarantined at the [facility]. He returned to long term after resolution of symptoms, but on day of admission he again developed fever and shortness of breath. To me, he denies any shortness of breath, chest discomfort, cough, fevers, chills, GI symptoms. States that he feels basically normal at this time.    In the ED, he had temp 100.4, p 100, /74. WBC 13, ferritin 1931, D-dimer 9.36, , trop and BNP wnl, procal negative. CTA with bilateral pulmonary emboli and GGO concerning for infarct vs infection.    Past Medical History:   Diagnosis Date    Down syndrome     Hypothyroidism     Mental retardation     Multinodular goiter     Myopia with astigmatism        History reviewed. No pertinent surgical history.    Review of patient's allergies indicates:  No Known Allergies    No current facility-administered medications on file prior to encounter.      Current Outpatient Medications on File Prior to Encounter   Medication Sig    ascorbic acid, vitamin C, (VITAMIN C) 500 MG tablet Take 500 mg by mouth 2 (two) times daily.     multivitamin (THERAGRAN) per tablet Take 1 tablet by mouth once daily.    olopatadine (PATANOL) 0.1 % ophthalmic solution 1 drop 2 (two) times daily.    omeprazole (PRILOSEC) 20 MG capsule Take 20 mg by mouth once daily.    calcium carbonate (OS-KARLI) 600 mg (1,500 mg) Tab Take 600 mg by mouth 2 (two) times daily with meals.    levothyroxine (SYNTHROID) 50 MCG tablet Take 75 mcg by mouth once daily.     ranitidine (ZANTAC) 15 mg/mL syrup Take 5 mLs (75 mg total) by mouth 2 (two) times daily as needed for Heartburn.    [DISCONTINUED] ammonium lactate 12 % Crea Apply topically.    [DISCONTINUED] urea (CARMOL) 40 % Crea Apply topically 2 (two) times daily.     Family History     Problem Relation (Age of Onset)    Asthma Brother        Tobacco Use    Smoking status: Never Smoker   Substance and Sexual Activity    Alcohol use: No    Drug use: No    Sexual activity: Never     Review of Systems   Constitutional: Positive for fever. Negative for chills and diaphoresis.   HENT: Negative for congestion and sore throat.    Eyes: Negative for discharge and visual disturbance.   Respiratory: Positive for shortness of breath. Negative for cough.    Cardiovascular: Negative for chest pain and leg swelling.   Gastrointestinal: Negative for abdominal pain, nausea and vomiting.   Genitourinary: Negative for dysuria and flank pain.   Musculoskeletal: Negative for arthralgias and joint swelling.   Skin: Negative for rash and wound.   Allergic/Immunologic: Negative for immunocompromised state.   Neurological: Negative for light-headedness and headaches.   Psychiatric/Behavioral: Negative for agitation and confusion.     Objective:     Vital Signs (Most Recent):  Temp: 98.3 °F (36.8 °C) (08/04/20 1755)  Pulse: 98 (08/04/20 1801)  Resp: 18 (08/04/20 1755)  BP: 123/76 (08/04/20 1755)  SpO2: 100 % (08/04/20 1755) Vital Signs (24h Range):  Temp:  [98.3 °F (36.8 °C)-100.4 °F (38 °C)] 98.3 °F (36.8 °C)  Pulse:  [] 98  Resp:   [18-20] 18  SpO2:  [91 %-100 %] 100 %  BP: (121-139)/(74-82) 123/76     Weight: 74.4 kg (164 lb)  Body mass index is 27.29 kg/m².    Physical Exam  Vitals signs reviewed.   Constitutional:       General: He is not in acute distress.     Appearance: He is well-developed. He is not diaphoretic.   HENT:      Head: Atraumatic.      Comments: Down facies     Nose: Nose normal.   Eyes:      General: No scleral icterus.     Pupils: Pupils are equal, round, and reactive to light.   Neck:      Musculoskeletal: Normal range of motion.      Vascular: No JVD.      Trachea: No tracheal deviation.   Cardiovascular:      Rate and Rhythm: Normal rate and regular rhythm.   Pulmonary:      Effort: Pulmonary effort is normal. No respiratory distress.   Abdominal:      General: There is no distension.      Palpations: Abdomen is soft. There is no mass.      Tenderness: There is no abdominal tenderness.      Hernia: No hernia is present.   Musculoskeletal:         General: No deformity.   Skin:     General: Skin is warm and dry.      Findings: No rash.   Neurological:      Mental Status: He is alert and oriented to person, place, and time.   Psychiatric:         Behavior: Behavior normal.           CRANIAL NERVES     CN III, IV, VI   Pupils are equal, round, and reactive to light.       Significant Labs: All pertinent labs within the past 24 hours have been reviewed.    Significant Imaging: I have reviewed all pertinent imaging results/findings within the past 24 hours.    Assessment/Plan:     * Acute pulmonary embolism without acute cor pulmonale  - detected on CT scan  - initiated on therapeutic lovenox  - can likely transition to DOAC if remains stable off O2  - no evidence of right heart strain  - check LE US    Postablative hypothyroidism  - continue home synthroid      Multilobar lung infiltrate  - likely infarction due to PE, but possibility remains that this could represent post-viral CAP  - initiate on rocephin/azithromycin  for now  - check procal and de-escalate rapidly      COVID-19 virus detected  - positive test on 7/16/20  - currently appears to be asymptomatic and possible that symptoms are due to PE  - defer advanced therapies for COVID for now given lack of hypoxia or other symptoms    Down syndrome  - will return to group home upon discharge      VTE Risk Mitigation (From admission, onward)         Ordered     enoxaparin injection 70 mg  Every 12 hours (non-standard times)      08/04/20 1915                   Jude Strauss MD  Department of Hospital Medicine   [facility]

## 2020-08-06 PROBLEM — R07.9 CHEST PAIN: Status: ACTIVE | Noted: 2020-08-06

## 2020-08-06 LAB
BASOPHILS # BLD AUTO: 0.05 K/UL (ref 0–0.2)
BASOPHILS NFR BLD: 0.2 % (ref 0–1.9)
DIFFERENTIAL METHOD: ABNORMAL
EOSINOPHIL # BLD AUTO: 0 K/UL (ref 0–0.5)
EOSINOPHIL NFR BLD: 0 % (ref 0–8)
ERYTHROCYTE [DISTWIDTH] IN BLOOD BY AUTOMATED COUNT: 14.8 % (ref 11.5–14.5)
HCT VFR BLD AUTO: 41.8 % (ref 40–54)
HGB BLD-MCNC: 14 G/DL (ref 14–18)
IMM GRANULOCYTES # BLD AUTO: 0.3 K/UL (ref 0–0.04)
IMM GRANULOCYTES NFR BLD AUTO: 1.4 % (ref 0–0.5)
LYMPHOCYTES # BLD AUTO: 1.8 K/UL (ref 1–4.8)
LYMPHOCYTES NFR BLD: 8.7 % (ref 18–48)
MAGNESIUM SERPL-MCNC: 2.4 MG/DL (ref 1.6–2.6)
MCH RBC QN AUTO: 31.3 PG (ref 27–31)
MCHC RBC AUTO-ENTMCNC: 33.5 G/DL (ref 32–36)
MCV RBC AUTO: 93 FL (ref 82–98)
MONOCYTES # BLD AUTO: 2.5 K/UL (ref 0.3–1)
MONOCYTES NFR BLD: 11.7 % (ref 4–15)
NEUTROPHILS # BLD AUTO: 16.5 K/UL (ref 1.8–7.7)
NEUTROPHILS NFR BLD: 78 % (ref 38–73)
NRBC BLD-RTO: 0 /100 WBC
PLATELET # BLD AUTO: 197 K/UL (ref 150–350)
PMV BLD AUTO: 11 FL (ref 9.2–12.9)
RBC # BLD AUTO: 4.48 M/UL (ref 4.6–6.2)
TROPONIN I SERPL DL<=0.01 NG/ML-MCNC: 0.01 NG/ML (ref 0–0.03)
WBC # BLD AUTO: 21.18 K/UL (ref 3.9–12.7)

## 2020-08-06 PROCEDURE — 25000003 PHARM REV CODE 250: Performed by: HOSPITALIST

## 2020-08-06 PROCEDURE — 36415 COLL VENOUS BLD VENIPUNCTURE: CPT

## 2020-08-06 PROCEDURE — 83735 ASSAY OF MAGNESIUM: CPT

## 2020-08-06 PROCEDURE — 85025 COMPLETE CBC W/AUTO DIFF WBC: CPT

## 2020-08-06 PROCEDURE — 63600175 PHARM REV CODE 636 W HCPCS: Performed by: HOSPITALIST

## 2020-08-06 PROCEDURE — 11000001 HC ACUTE MED/SURG PRIVATE ROOM

## 2020-08-06 PROCEDURE — 63700000 PHARM REV CODE 250 ALT 637 W/O HCPCS: Performed by: HOSPITALIST

## 2020-08-06 PROCEDURE — 84484 ASSAY OF TROPONIN QUANT: CPT

## 2020-08-06 PROCEDURE — 93005 ELECTROCARDIOGRAM TRACING: CPT

## 2020-08-06 RX ORDER — IBUPROFEN 400 MG/1
400 TABLET ORAL EVERY 6 HOURS PRN
Status: DISCONTINUED | OUTPATIENT
Start: 2020-08-06 | End: 2020-08-10 | Stop reason: HOSPADM

## 2020-08-06 RX ADMIN — ATORVASTATIN CALCIUM 40 MG: 40 TABLET, FILM COATED ORAL at 10:08

## 2020-08-06 RX ADMIN — OXYCODONE HYDROCHLORIDE AND ACETAMINOPHEN 500 MG: 500 TABLET ORAL at 09:08

## 2020-08-06 RX ADMIN — POLYETHYLENE GLYCOL (3350) 17 G: 17 POWDER, FOR SOLUTION ORAL at 09:08

## 2020-08-06 RX ADMIN — CEFTRIAXONE 1 G: 1 INJECTION, SOLUTION INTRAVENOUS at 03:08

## 2020-08-06 RX ADMIN — OLOPATADINE HYDROCHLORIDE 1 DROP: 1 SOLUTION/ DROPS OPHTHALMIC at 10:08

## 2020-08-06 RX ADMIN — PANTOPRAZOLE SODIUM 40 MG: 40 TABLET, DELAYED RELEASE ORAL at 09:08

## 2020-08-06 RX ADMIN — LEVOTHYROXINE SODIUM 75 MCG: 75 TABLET ORAL at 09:08

## 2020-08-06 RX ADMIN — OXYCODONE HYDROCHLORIDE AND ACETAMINOPHEN 500 MG: 500 TABLET ORAL at 10:08

## 2020-08-06 RX ADMIN — ENOXAPARIN SODIUM 70 MG: 100 INJECTION SUBCUTANEOUS at 10:08

## 2020-08-06 RX ADMIN — AZITHROMYCIN 250 MG: 250 TABLET, FILM COATED ORAL at 09:08

## 2020-08-06 RX ADMIN — ENOXAPARIN SODIUM 70 MG: 100 INJECTION SUBCUTANEOUS at 01:08

## 2020-08-06 RX ADMIN — THERA TABS 1 TABLET: TAB at 09:08

## 2020-08-06 NOTE — PLAN OF CARE
Pt in bed watching tv at this time. Pt AAO. Answers yes and no questions. Mouth very dry and sometimes hard to understand. Needs encouragement with eating and drinking. Refused breakfast but drank . Lunch pt had a peanut butter and jelly sandwhich and apple juice.Ate 75%.Continues on IV ABT with 0 ase noted.. Pericare provided per incontinent episode. Continue to apply lotion to BLE dryness. Pt  continues on tele with 0 red alarms going off. Pt call light within reach and side rails up x 2.

## 2020-08-06 NOTE — SUBJECTIVE & OBJECTIVE
Interval History: Seen via [employer] telemedicine platform: I was available outside the room calling from computer with headphones. Does report some mild chest pain, constant and achy. Unable to tell me if associated with breathing. Suspect that this is pleuritic in nature. Denies shortness of breath or fever.    Review of Systems   Constitutional: Positive for fever.   Respiratory: Negative for cough and shortness of breath.    Cardiovascular: Negative for chest pain and leg swelling.   Gastrointestinal: Negative for abdominal pain, diarrhea, nausea and vomiting.   Neurological: Negative for weakness.     Objective:     Vital Signs (Most Recent):  Temp: 98.8 °F (37.1 °C) (08/06/20 0904)  Pulse: (!) 113 (08/06/20 1741)  Resp: 16 (08/06/20 0904)  BP: 112/77 (08/06/20 1741)  SpO2: (!) 92 % (08/06/20 1741) Vital Signs (24h Range):  Temp:  [98.4 °F (36.9 °C)-98.8 °F (37.1 °C)] 98.8 °F (37.1 °C)  Pulse:  [104-113] 113  Resp:  [16-20] 16  SpO2:  [90 %-96 %] 92 %  BP: (111-125)/(64-78) 112/77     Weight: 59.8 kg (131 lb 13.4 oz)  Body mass index is 21.94 kg/m².    Intake/Output Summary (Last 24 hours) at 8/6/2020 1826  Last data filed at 8/6/2020 1800  Gross per 24 hour   Intake 450 ml   Output 483 ml   Net -33 ml      Physical Exam  Vitals signs reviewed.   Constitutional:       General: He is not in acute distress.     Appearance: He is well-developed. He is not diaphoretic.   HENT:      Head: Atraumatic.      Comments: Down facies     Nose: Nose normal.   Eyes:      General: No scleral icterus.     Pupils: Pupils are equal, round, and reactive to light.   Neck:      Musculoskeletal: Normal range of motion.      Vascular: No JVD.      Trachea: No tracheal deviation.   Pulmonary:      Effort: Pulmonary effort is normal. No respiratory distress.   Abdominal:      General: Abdomen is flat. There is no distension.   Musculoskeletal:         General: No deformity.   Skin:     Findings: No rash.   Neurological:      Mental Status:  He is alert and oriented to person, place, and time.   Psychiatric:         Behavior: Behavior normal.         Significant Labs: All pertinent labs within the past 24 hours have been reviewed.    Significant Imaging: I have reviewed all pertinent imaging results/findings within the past 24 hours.

## 2020-08-06 NOTE — PLAN OF CARE
1900 Appeared to be in no pain or distress.Swallowed meds with out issue.     No accu ck.     Tele: SR to ST,  HR 90- low 100,  No alarms.     Bed in lowest position, wheels locked, non skid socks, ID band worn, personal items and call bell with in reach, bed alarm set.

## 2020-08-06 NOTE — PLAN OF CARE
VN cued into patients room for rounding - Patient is in no acute distress at this time.  Call light within reach. Will continue to monitor closely.  Patient instructed to call immediately for SOB.   On room air and stated feeling much better

## 2020-08-06 NOTE — ASSESSMENT & PLAN NOTE
- detected on CT scan  - initiated on therapeutic lovenox  - can likely transition to DOAC if remains stable off O2  - no evidence of right heart strain  - LE US negative for DVT

## 2020-08-06 NOTE — PLAN OF CARE
CM contacted [facility] for contact information from last d/c.  Information updated in EPI[employer], spoke with Iwona Kowalski () [phone] regarding plans for d/c.  Mr Kowalski is hesitant to accept pt back without a new negative test.  Explained that pt is asymptomatic at this time, SOB thought to be due to PE.  States he will need to speak with MD regarding possible retest as pt does reside in a home with other residents.  Primary staff notified.    At baseline, pt high functioning despite Down Syndrome dx.  Pt does ambulate independently, oriented but has speech hesitancy making him hard to understand at times.  Pt does hold a part time job.  [facility] staff coordinates his f/u appts and medications with Dr Tomi Talbert and will be arranging his f/u appt after d/c.  D/C summary will need to be faxed to PCP as well as Mr Kowalski at [phone] once available.      Mr Kowalski pt is getting close to d/c, will need to sort out need for repeat Covid test with MD.  [facility]YA[facility] will cont to follow and has provided representative with contact information.    Margoth Tovar, RN    [phone]

## 2020-08-06 NOTE — PROGRESS NOTES
[facility] Hospital Medicine  Progress Note    Patient Name: Ernie Davila  MRN: [id number]  Patient Class: IP- Inpatient   Admission Date: 8/4/2020  Length of Stay: 2 days  Attending Physician: Jude Strauss, *  Primary Care Provider: To Obtain Unable        Subjective:     Principal Problem:Acute pulmonary embolism without acute cor pulmonale        HPI:  Mr. Davila is a 60yo man with Down syndrome and hypothyroidism who lives in a group home who presented to the ED with shortness of breath and fever. He was recently diagnosed with COVID on 7/16/20 and was quarantined at the [facility]. He returned to longterm after resolution of symptoms, but on day of admission he again developed fever and shortness of breath. To me, he denies any shortness of breath, chest discomfort, cough, fevers, chills, GI symptoms. States that he feels basically normal at this time.    In the ED, he had temp 100.4, p 100, /74. WBC 13, ferritin 1931, D-dimer 9.36, , trop and BNP wnl, procal negative. CTA with bilateral pulmonary emboli and GGO concerning for infarct vs infection.    Overview/Hospital Course:  No notes on file    Interval History: Seen via [employer] telemedicine platform: I was available outside the room calling from computer with headphones. Does report some mild chest pain, constant and achy. Unable to tell me if associated with breathing. Suspect that this is pleuritic in nature. Denies shortness of breath or fever.    Review of Systems   Constitutional: Positive for fever.   Respiratory: Negative for cough and shortness of breath.    Cardiovascular: Negative for chest pain and leg swelling.   Gastrointestinal: Negative for abdominal pain, diarrhea, nausea and vomiting.   Neurological: Negative for weakness.     Objective:     Vital Signs (Most Recent):  Temp: 98.8 °F (37.1 °C) (08/06/20 0904)  Pulse: (!) 113 (08/06/20 1741)  Resp: 16 (08/06/20 0904)  BP: 112/77 (08/06/20  1741)  SpO2: (!) 92 % (08/06/20 1741) Vital Signs (24h Range):  Temp:  [98.4 °F (36.9 °C)-98.8 °F (37.1 °C)] 98.8 °F (37.1 °C)  Pulse:  [104-113] 113  Resp:  [16-20] 16  SpO2:  [90 %-96 %] 92 %  BP: (111-125)/(64-78) 112/77     Weight: 59.8 kg (131 lb 13.4 oz)  Body mass index is 21.94 kg/m².    Intake/Output Summary (Last 24 hours) at 8/6/2020 1826  Last data filed at 8/6/2020 1800  Gross per 24 hour   Intake 450 ml   Output 483 ml   Net -33 ml      Physical Exam  Vitals signs reviewed.   Constitutional:       General: He is not in acute distress.     Appearance: He is well-developed. He is not diaphoretic.   HENT:      Head: Atraumatic.      Comments: Down facies     Nose: Nose normal.   Eyes:      General: No scleral icterus.     Pupils: Pupils are equal, round, and reactive to light.   Neck:      Musculoskeletal: Normal range of motion.      Vascular: No JVD.      Trachea: No tracheal deviation.   Pulmonary:      Effort: Pulmonary effort is normal. No respiratory distress.   Abdominal:      General: Abdomen is flat. There is no distension.   Musculoskeletal:         General: No deformity.   Skin:     Findings: No rash.   Neurological:      Mental Status: He is alert and oriented to person, place, and time.   Psychiatric:         Behavior: Behavior normal.         Significant Labs: All pertinent labs within the past 24 hours have been reviewed.    Significant Imaging: I have reviewed all pertinent imaging results/findings within the past 24 hours.      Assessment/Plan:      * Acute pulmonary embolism without acute cor pulmonale  - detected on CT scan  - initiated on therapeutic lovenox  - can likely transition to DOAC if remains stable off O2  - no evidence of right heart strain  - LE US negative for DVT    Chest pain  - likely pleuritic due to PE  - check trop, EKG  - ibuprofen if negative      Postablative hypothyroidism  - continue home synthroid      Multilobar lung infiltrate  - likely infarction due to PE,  but possibility remains that this could represent post-viral CAP  - initiate on rocephin/azithromycin, will continue for now given continued fever      COVID-19 virus detected  - positive test on 7/16/20  - currently appears to be asymptomatic and possible that symptoms are due to PE  - defer advanced therapies for COVID for now given lack of hypoxia or other symptoms    Down syndrome  - will return to group home upon discharge        VTE Risk Mitigation (From admission, onward)         Ordered     enoxaparin injection 70 mg  Every 12 hours (non-standard times)      08/04/20 1915                      Jude Strauss MD  Department of Hospital Medicine   [facility]

## 2020-08-07 PROBLEM — R07.81 PLEURODYNIA: Status: ACTIVE | Noted: 2020-08-06

## 2020-08-07 LAB
AMM URATE CRY URNS QL MICRO: NORMAL
ANION GAP SERPL CALC-SCNC: 6 MMOL/L (ref 8–16)
BACTERIA #/AREA URNS HPF: NORMAL /HPF
BASOPHILS # BLD AUTO: 0.06 K/UL (ref 0–0.2)
BASOPHILS NFR BLD: 0.3 % (ref 0–1.9)
BILIRUB UR QL STRIP: NEGATIVE
BUN SERPL-MCNC: 17 MG/DL (ref 8–23)
CALCIUM SERPL-MCNC: 8.9 MG/DL (ref 8.7–10.5)
CHLORIDE SERPL-SCNC: 104 MMOL/L (ref 95–110)
CLARITY UR: CLEAR
CO2 SERPL-SCNC: 31 MMOL/L (ref 23–29)
COLOR UR: ABNORMAL
CREAT SERPL-MCNC: 0.8 MG/DL (ref 0.5–1.4)
DIFFERENTIAL METHOD: ABNORMAL
EOSINOPHIL # BLD AUTO: 0 K/UL (ref 0–0.5)
EOSINOPHIL NFR BLD: 0 % (ref 0–8)
ERYTHROCYTE [DISTWIDTH] IN BLOOD BY AUTOMATED COUNT: 15.1 % (ref 11.5–14.5)
EST. GFR  (AFRICAN AMERICAN): >60 ML/MIN/1.73 M^2
EST. GFR  (NON AFRICAN AMERICAN): >60 ML/MIN/1.73 M^2
GLUCOSE SERPL-MCNC: 112 MG/DL (ref 70–110)
GLUCOSE UR QL STRIP: NEGATIVE
HCT VFR BLD AUTO: 38.6 % (ref 40–54)
HGB BLD-MCNC: 13 G/DL (ref 14–18)
HGB UR QL STRIP: NEGATIVE
HYALINE CASTS #/AREA URNS LPF: 0 /LPF
IMM GRANULOCYTES # BLD AUTO: 0.29 K/UL (ref 0–0.04)
IMM GRANULOCYTES NFR BLD AUTO: 1.4 % (ref 0–0.5)
KETONES UR QL STRIP: NEGATIVE
LEUKOCYTE ESTERASE UR QL STRIP: NEGATIVE
LYMPHOCYTES # BLD AUTO: 1.9 K/UL (ref 1–4.8)
LYMPHOCYTES NFR BLD: 9.2 % (ref 18–48)
MAGNESIUM SERPL-MCNC: 2.4 MG/DL (ref 1.6–2.6)
MCH RBC QN AUTO: 31.3 PG (ref 27–31)
MCHC RBC AUTO-ENTMCNC: 33.7 G/DL (ref 32–36)
MCV RBC AUTO: 93 FL (ref 82–98)
MICROSCOPIC COMMENT: NORMAL
MONOCYTES # BLD AUTO: 2.6 K/UL (ref 0.3–1)
MONOCYTES NFR BLD: 12.6 % (ref 4–15)
NEUTROPHILS # BLD AUTO: 15.6 K/UL (ref 1.8–7.7)
NEUTROPHILS NFR BLD: 76.5 % (ref 38–73)
NITRITE UR QL STRIP: NEGATIVE
NRBC BLD-RTO: 0 /100 WBC
PH UR STRIP: 6 [PH] (ref 5–8)
PLATELET # BLD AUTO: 179 K/UL (ref 150–350)
PLATELET BLD QL SMEAR: ABNORMAL
PMV BLD AUTO: 11.5 FL (ref 9.2–12.9)
POTASSIUM SERPL-SCNC: 3.8 MMOL/L (ref 3.5–5.1)
PROT UR QL STRIP: ABNORMAL
RBC # BLD AUTO: 4.15 M/UL (ref 4.6–6.2)
RBC #/AREA URNS HPF: 0 /HPF (ref 0–4)
SODIUM SERPL-SCNC: 141 MMOL/L (ref 136–145)
SP GR UR STRIP: >=1.03 (ref 1–1.03)
SQUAMOUS #/AREA URNS HPF: 2 /HPF
URN SPEC COLLECT METH UR: ABNORMAL
UROBILINOGEN UR STRIP-ACNC: ABNORMAL EU/DL
WBC # BLD AUTO: 20.42 K/UL (ref 3.9–12.7)
WBC #/AREA URNS HPF: 0 /HPF (ref 0–5)

## 2020-08-07 PROCEDURE — 81000 URINALYSIS NONAUTO W/SCOPE: CPT

## 2020-08-07 PROCEDURE — 80048 BASIC METABOLIC PNL TOTAL CA: CPT

## 2020-08-07 PROCEDURE — 25000003 PHARM REV CODE 250: Performed by: NURSE PRACTITIONER

## 2020-08-07 PROCEDURE — 25000003 PHARM REV CODE 250: Performed by: HOSPITALIST

## 2020-08-07 PROCEDURE — 11000001 HC ACUTE MED/SURG PRIVATE ROOM

## 2020-08-07 PROCEDURE — U0003 INFECTIOUS AGENT DETECTION BY NUCLEIC ACID (DNA OR RNA); SEVERE ACUTE RESPIRATORY SYNDROME CORONAVIRUS 2 (SARS-COV-2) (CORONAVIRUS DISEASE [COVID-19]), AMPLIFIED PROBE TECHNIQUE, MAKING USE OF HIGH THROUGHPUT TECHNOLOGIES AS DESCRIBED BY CMS-2020-01-R: HCPCS

## 2020-08-07 PROCEDURE — 63700000 PHARM REV CODE 250 ALT 637 W/O HCPCS: Performed by: HOSPITALIST

## 2020-08-07 PROCEDURE — 87040 BLOOD CULTURE FOR BACTERIA: CPT | Mod: 59

## 2020-08-07 PROCEDURE — 83735 ASSAY OF MAGNESIUM: CPT

## 2020-08-07 PROCEDURE — 36415 COLL VENOUS BLD VENIPUNCTURE: CPT

## 2020-08-07 PROCEDURE — 85025 COMPLETE CBC W/AUTO DIFF WBC: CPT

## 2020-08-07 PROCEDURE — 63600175 PHARM REV CODE 636 W HCPCS: Performed by: HOSPITALIST

## 2020-08-07 RX ORDER — SODIUM CHLORIDE FOR INHALATION 3 %
4 VIAL, NEBULIZER (ML) INHALATION
Status: DISCONTINUED | OUTPATIENT
Start: 2020-08-07 | End: 2020-08-10 | Stop reason: HOSPADM

## 2020-08-07 RX ORDER — ACETAMINOPHEN 325 MG/1
650 TABLET ORAL EVERY 4 HOURS PRN
Status: DISCONTINUED | OUTPATIENT
Start: 2020-08-07 | End: 2020-08-10 | Stop reason: HOSPADM

## 2020-08-07 RX ORDER — ALBUTEROL SULFATE 2.5 MG/.5ML
2.5 SOLUTION RESPIRATORY (INHALATION)
Status: DISCONTINUED | OUTPATIENT
Start: 2020-08-07 | End: 2020-08-10 | Stop reason: HOSPADM

## 2020-08-07 RX ADMIN — ACETAMINOPHEN 650 MG: 325 TABLET ORAL at 05:08

## 2020-08-07 RX ADMIN — CEFTRIAXONE 1 G: 1 INJECTION, SOLUTION INTRAVENOUS at 03:08

## 2020-08-07 RX ADMIN — LEVOTHYROXINE SODIUM 75 MCG: 75 TABLET ORAL at 09:08

## 2020-08-07 RX ADMIN — ACETAMINOPHEN 650 MG: 325 TABLET ORAL at 11:08

## 2020-08-07 RX ADMIN — ENOXAPARIN SODIUM 70 MG: 100 INJECTION SUBCUTANEOUS at 12:08

## 2020-08-07 RX ADMIN — CALCIUM CARBONATE (ANTACID) CHEW TAB 500 MG 500 MG: 500 CHEW TAB at 09:08

## 2020-08-07 RX ADMIN — POLYETHYLENE GLYCOL (3350) 17 G: 17 POWDER, FOR SOLUTION ORAL at 09:08

## 2020-08-07 RX ADMIN — OLOPATADINE HYDROCHLORIDE 1 DROP: 1 SOLUTION/ DROPS OPHTHALMIC at 09:08

## 2020-08-07 RX ADMIN — AZITHROMYCIN 250 MG: 250 TABLET, FILM COATED ORAL at 09:08

## 2020-08-07 RX ADMIN — OXYCODONE HYDROCHLORIDE AND ACETAMINOPHEN 500 MG: 500 TABLET ORAL at 08:08

## 2020-08-07 RX ADMIN — THERA TABS 1 TABLET: TAB at 09:08

## 2020-08-07 RX ADMIN — ENOXAPARIN SODIUM 70 MG: 100 INJECTION SUBCUTANEOUS at 11:08

## 2020-08-07 RX ADMIN — OXYCODONE HYDROCHLORIDE AND ACETAMINOPHEN 500 MG: 500 TABLET ORAL at 09:08

## 2020-08-07 RX ADMIN — PANTOPRAZOLE SODIUM 40 MG: 40 TABLET, DELAYED RELEASE ORAL at 09:08

## 2020-08-07 RX ADMIN — ATORVASTATIN CALCIUM 40 MG: 40 TABLET, FILM COATED ORAL at 08:08

## 2020-08-07 RX ADMIN — OLOPATADINE HYDROCHLORIDE 1 DROP: 1 SOLUTION/ DROPS OPHTHALMIC at 08:08

## 2020-08-07 NOTE — PLAN OF CARE
Pt in bed watching tv at this time. Much more cooperative with care today. AAO. Speech slurred but able to make needs known to staff. Pt continues to maintain airborne isolation precations due to COVID. Catheter put in due to urinary retention. Noted orange colored urine in the bag. Collected UA. Negative at this time. COVID test done today. Results pending. Continues on IV ABT with 0 ase noted. Pt encouraged to eat and drink today. Tolerated fluids today.0 respiratory distress noted today during care. Call light within reach and side rails up x2.

## 2020-08-07 NOTE — PLAN OF CARE
1900 Appeared to be in no pain or distress.  Reported to day nurse that he intended to hold his stool.  Pt is receiving Murelax daily, however, he is suppressing the urge to have a BM.      No accu ck.     Tele: ST,   teens,  No alarms.     0630 No void all night.  Bladder scan shoed 466 cc urine in bladder.  Notified med team.  Pending orders.    Bed in lowest position, wheels locked, non skid socks, ID band worn, personal items and call bell with in reach, bed alarm set.

## 2020-08-08 LAB
ANION GAP SERPL CALC-SCNC: 6 MMOL/L (ref 8–16)
BASOPHILS # BLD AUTO: 0.04 K/UL (ref 0–0.2)
BASOPHILS NFR BLD: 0.3 % (ref 0–1.9)
BUN SERPL-MCNC: 17 MG/DL (ref 8–23)
CALCIUM SERPL-MCNC: 8.8 MG/DL (ref 8.7–10.5)
CHLORIDE SERPL-SCNC: 104 MMOL/L (ref 95–110)
CO2 SERPL-SCNC: 32 MMOL/L (ref 23–29)
CREAT SERPL-MCNC: 0.8 MG/DL (ref 0.5–1.4)
DIFFERENTIAL METHOD: ABNORMAL
EOSINOPHIL # BLD AUTO: 0 K/UL (ref 0–0.5)
EOSINOPHIL NFR BLD: 0.2 % (ref 0–8)
ERYTHROCYTE [DISTWIDTH] IN BLOOD BY AUTOMATED COUNT: 15.2 % (ref 11.5–14.5)
EST. GFR  (AFRICAN AMERICAN): >60 ML/MIN/1.73 M^2
EST. GFR  (NON AFRICAN AMERICAN): >60 ML/MIN/1.73 M^2
GLUCOSE SERPL-MCNC: 94 MG/DL (ref 70–110)
HCT VFR BLD AUTO: 37.1 % (ref 40–54)
HGB BLD-MCNC: 12.6 G/DL (ref 14–18)
IMM GRANULOCYTES # BLD AUTO: 0.18 K/UL (ref 0–0.04)
IMM GRANULOCYTES NFR BLD AUTO: 1.3 % (ref 0–0.5)
LYMPHOCYTES # BLD AUTO: 1.6 K/UL (ref 1–4.8)
LYMPHOCYTES NFR BLD: 11.5 % (ref 18–48)
MAGNESIUM SERPL-MCNC: 2.5 MG/DL (ref 1.6–2.6)
MCH RBC QN AUTO: 31.6 PG (ref 27–31)
MCHC RBC AUTO-ENTMCNC: 34 G/DL (ref 32–36)
MCV RBC AUTO: 93 FL (ref 82–98)
MONOCYTES # BLD AUTO: 1.6 K/UL (ref 0.3–1)
MONOCYTES NFR BLD: 11.3 % (ref 4–15)
NEUTROPHILS # BLD AUTO: 10.7 K/UL (ref 1.8–7.7)
NEUTROPHILS NFR BLD: 75.4 % (ref 38–73)
NRBC BLD-RTO: 0 /100 WBC
PLATELET # BLD AUTO: 178 K/UL (ref 150–350)
PMV BLD AUTO: 11.7 FL (ref 9.2–12.9)
POCT GLUCOSE: 89 MG/DL (ref 70–110)
POTASSIUM SERPL-SCNC: 3.9 MMOL/L (ref 3.5–5.1)
RBC # BLD AUTO: 3.99 M/UL (ref 4.6–6.2)
SARS-COV-2 RNA RESP QL NAA+PROBE: NOT DETECTED
SODIUM SERPL-SCNC: 142 MMOL/L (ref 136–145)
WBC # BLD AUTO: 14.12 K/UL (ref 3.9–12.7)

## 2020-08-08 PROCEDURE — 85025 COMPLETE CBC W/AUTO DIFF WBC: CPT

## 2020-08-08 PROCEDURE — 36415 COLL VENOUS BLD VENIPUNCTURE: CPT

## 2020-08-08 PROCEDURE — 80048 BASIC METABOLIC PNL TOTAL CA: CPT

## 2020-08-08 PROCEDURE — 83735 ASSAY OF MAGNESIUM: CPT

## 2020-08-08 PROCEDURE — 63700000 PHARM REV CODE 250 ALT 637 W/O HCPCS: Performed by: HOSPITALIST

## 2020-08-08 PROCEDURE — 63600175 PHARM REV CODE 636 W HCPCS: Performed by: HOSPITALIST

## 2020-08-08 PROCEDURE — 11000001 HC ACUTE MED/SURG PRIVATE ROOM

## 2020-08-08 PROCEDURE — 25000003 PHARM REV CODE 250: Performed by: HOSPITALIST

## 2020-08-08 RX ADMIN — LEVOTHYROXINE SODIUM 75 MCG: 75 TABLET ORAL at 09:08

## 2020-08-08 RX ADMIN — ENOXAPARIN SODIUM 70 MG: 100 INJECTION SUBCUTANEOUS at 11:08

## 2020-08-08 RX ADMIN — ENOXAPARIN SODIUM 70 MG: 100 INJECTION SUBCUTANEOUS at 01:08

## 2020-08-08 RX ADMIN — OXYCODONE HYDROCHLORIDE AND ACETAMINOPHEN 500 MG: 500 TABLET ORAL at 09:08

## 2020-08-08 RX ADMIN — POLYETHYLENE GLYCOL (3350) 17 G: 17 POWDER, FOR SOLUTION ORAL at 09:08

## 2020-08-08 RX ADMIN — OLOPATADINE HYDROCHLORIDE 1 DROP: 1 SOLUTION/ DROPS OPHTHALMIC at 09:08

## 2020-08-08 RX ADMIN — THERA TABS 1 TABLET: TAB at 09:08

## 2020-08-08 RX ADMIN — CALCIUM CARBONATE (ANTACID) CHEW TAB 500 MG 500 MG: 500 CHEW TAB at 09:08

## 2020-08-08 RX ADMIN — PANTOPRAZOLE SODIUM 40 MG: 40 TABLET, DELAYED RELEASE ORAL at 09:08

## 2020-08-08 RX ADMIN — AZITHROMYCIN 250 MG: 250 TABLET, FILM COATED ORAL at 09:08

## 2020-08-08 RX ADMIN — ATORVASTATIN CALCIUM 40 MG: 40 TABLET, FILM COATED ORAL at 09:08

## 2020-08-08 RX ADMIN — CEFTRIAXONE 1 G: 1 INJECTION, SOLUTION INTRAVENOUS at 02:08

## 2020-08-08 NOTE — PROGRESS NOTES
[facility] Hospital Medicine  Progress Note    Patient Name: Ernie Davila  MRN: [id number]  Patient Class: IP- Inpatient   Admission Date: 8/4/2020  Length of Stay: 4 days  Attending Physician: Jude Strauss, *  Primary Care Provider: To Obtain Unable        Subjective:     Principal Problem:Acute pulmonary embolism without acute cor pulmonale        HPI:  Mr. Davila is a 60yo man with Down syndrome and hypothyroidism who lives in a group home who presented to the ED with shortness of breath and fever. He was recently diagnosed with COVID on 7/16/20 and was quarantined at the [facility]. He returned to senior living after resolution of symptoms, but on day of admission he again developed fever and shortness of breath. To me, he denies any shortness of breath, chest discomfort, cough, fevers, chills, GI symptoms. States that he feels basically normal at this time.    In the ED, he had temp 100.4, p 100, /74. WBC 13, ferritin 1931, D-dimer 9.36, , trop and BNP wnl, procal negative. CTA with bilateral pulmonary emboli and GGO concerning for infarct vs infection.    Overview/Hospital Course:  No notes on file    Interval History: Afebrile today. Denies chest pain, shortness of breath, abdominal symptoms. Asks when he can leave the hospital. We are awaiting repeat COVID swab prior to discharge back to group home.    Review of Systems   Constitutional: Negative for fever.   Respiratory: Negative for cough and shortness of breath.    Cardiovascular: Negative for chest pain and leg swelling.   Gastrointestinal: Negative for abdominal pain, diarrhea, nausea and vomiting.   Neurological: Negative for weakness.     Objective:     Vital Signs (Most Recent):  Temp: 97.6 °F (36.4 °C) (08/08/20 0940)  Pulse: 88 (08/08/20 0940)  Resp: 18 (08/08/20 0940)  BP: 116/72 (08/08/20 0940)  SpO2: (!) 93 % (08/08/20 0940) Vital Signs (24h Range):  Temp:  [96.5 °F (35.8 °C)-100.6 °F (38.1 °C)] 97.6  °F (36.4 °C)  Pulse:  [83-98] 88  Resp:  [16-18] 18  SpO2:  [93 %-96 %] 93 %  BP: (110-120)/(63-75) 116/72     Weight: 59.8 kg (131 lb 13.4 oz)  Body mass index is 21.94 kg/m².    Intake/Output Summary (Last 24 hours) at 8/8/2020 1041  Last data filed at 8/8/2020 0600  Gross per 24 hour   Intake 650 ml   Output 1075 ml   Net -425 ml      Physical Exam  Vitals signs reviewed.   Constitutional:       General: He is not in acute distress.     Appearance: He is well-developed. He is not diaphoretic.   HENT:      Head: Atraumatic.      Comments: Down facies     Nose: Nose normal.   Eyes:      General: No scleral icterus.     Pupils: Pupils are equal, round, and reactive to light.   Neck:      Musculoskeletal: Normal range of motion.      Vascular: No JVD.      Trachea: No tracheal deviation.   Pulmonary:      Effort: Pulmonary effort is normal. No respiratory distress.   Abdominal:      General: Abdomen is flat. There is no distension.   Musculoskeletal:         General: No deformity.   Skin:     Findings: No rash.   Neurological:      Mental Status: He is alert and oriented to person, place, and time.   Psychiatric:         Behavior: Behavior normal.         Significant Labs: All pertinent labs within the past 24 hours have been reviewed.    Significant Imaging: I have reviewed all pertinent imaging results/findings within the past 24 hours.      Assessment/Plan:      * Acute pulmonary embolism without acute cor pulmonale  - detected on CT scan  - initiated on therapeutic lovenox  - can transition to DOAC on discharge  - no evidence of right heart strain  - LE US negative for DVT    Pleurodynia  - likely pleuritic due to PE  - trop, EKG negative  - ibuprofen      Postablative hypothyroidism  - continue home synthroid      Multilobar lung infiltrate  - likely infarction due to PE, but possibility remains that this could represent post-viral CAP  - initiate on rocephin/azithromycin, will continue for now -> complete 5d  course abx on discharge (today is final day)      COVID-19 virus detected  - positive test on 7/16/20  - currently appears to be asymptomatic and possible that symptoms are due to PE  - defer advanced therapies for COVID for now given lack of hypoxia or other symptoms  - awaiting 2nd COVID test prior to discharge    Down syndrome  - will return to group home upon discharge        VTE Risk Mitigation (From admission, onward)         Ordered     enoxaparin injection 70 mg  Every 12 hours (non-standard times)      08/04/20 1915                      Jude Strauss MD  Department of Hospital Medicine   [facility]

## 2020-08-08 NOTE — PLAN OF CARE
1910H Patient is awake, alert and oriented to name and day. He verbalized its Friday. Maintained on contact, airborne and droplet isolation for COVID-19. Care plan explained, no evidence of learning. VIP care model explained. On room air, O2 saturation 96%. On heart monitoring running Sinus Rhythm, heart rate 80s. IV site to the right antecubital fossa and left hand 20G; flushed and saline locked. Fr.14 parks catheter to urimeter, draining yellow urine. Maintained on regular diet. Maintained on fall precaution. Bed in lowest position, bed alarms on, call light within reach and instructed to call for help when needed.     Due medications given. Swallows pill good. Complains of pain to parks site, statlock was off of skin and catheter was coiled. Statlock replaced.       0600H Patient slept for few hours after midnight. Insisting to go home.

## 2020-08-08 NOTE — ASSESSMENT & PLAN NOTE
- positive test on 7/16/20  - currently appears to be asymptomatic and possible that symptoms are due to PE  - defer advanced therapies for COVID for now given lack of hypoxia or other symptoms  - awaiting 2nd COVID test prior to discharge

## 2020-08-08 NOTE — PROGRESS NOTES
[facility] Medicine  Progress Note    Patient Name: Ernie Davila  MRN: [id number]  Patient Class: IP- Inpatient   Admission Date: 8/4/2020  Length of Stay: 3 days  Attending Physician: Jude Strauss, *  Primary Care Provider: To Obtain Unable        Subjective:     Principal Problem:Acute pulmonary embolism without acute cor pulmonale        HPI:  Mr. Davila is a 60yo man with Down syndrome and hypothyroidism who lives in a group home who presented to the ED with shortness of breath and fever. He was recently diagnosed with COVID on 7/16/20 and was quarantined at the [facility]. He returned to snf after resolution of symptoms, but on day of admission he again developed fever and shortness of breath. To me, he denies any shortness of breath, chest discomfort, cough, fevers, chills, GI symptoms. States that he feels basically normal at this time.    In the ED, he had temp 100.4, p 100, /74. WBC 13, ferritin 1931, D-dimer 9.36, , trop and BNP wnl, procal negative. CTA with bilateral pulmonary emboli and GGO concerning for infarct vs infection.    Overview/Hospital Course:  No notes on file    Interval History: Seen via [employer] telemedicine platform: I was available outside the room calling from computer with headphones. Does report some mild chest pain, worse with breathing. States pain medicine is helping. Cardiac workup yesterday negative. Likely pleuritic.    Review of Systems   Constitutional: Positive for fever.   Respiratory: Negative for cough and shortness of breath.    Cardiovascular: Negative for chest pain and leg swelling.   Gastrointestinal: Negative for abdominal pain, diarrhea, nausea and vomiting.   Neurological: Negative for weakness.     Objective:     Vital Signs (Most Recent):  Temp: 96.5 °F (35.8 °C) (08/07/20 1420)  Pulse: 85 (08/07/20 1558)  Resp: 18 (08/07/20 1420)  BP: 110/63 (08/07/20 1420)  SpO2: 98 % (08/07/20 0841) Vital Signs  (24h Range):  Temp:  [96.5 °F (35.8 °C)-101.2 °F (38.4 °C)] 96.5 °F (35.8 °C)  Pulse:  [] 85  Resp:  [16-18] 18  SpO2:  [94 %-98 %] 98 %  BP: (110-120)/(61-74) 110/63     Weight: 59.8 kg (131 lb 13.4 oz)  Body mass index is 21.94 kg/m².    Intake/Output Summary (Last 24 hours) at 8/7/2020 1916  Last data filed at 8/7/2020 1700  Gross per 24 hour   Intake 847 ml   Output 925 ml   Net -78 ml      Physical Exam  Vitals signs reviewed.   Constitutional:       General: He is not in acute distress.     Appearance: He is well-developed. He is not diaphoretic.   HENT:      Head: Atraumatic.      Comments: Down facies     Nose: Nose normal.   Eyes:      General: No scleral icterus.     Pupils: Pupils are equal, round, and reactive to light.   Neck:      Musculoskeletal: Normal range of motion.      Vascular: No JVD.      Trachea: No tracheal deviation.   Pulmonary:      Effort: Pulmonary effort is normal. No respiratory distress.   Abdominal:      General: Abdomen is flat. There is no distension.   Musculoskeletal:         General: No deformity.   Skin:     Findings: No rash.   Neurological:      Mental Status: He is alert and oriented to person, place, and time.   Psychiatric:         Behavior: Behavior normal.         Significant Labs: All pertinent labs within the past 24 hours have been reviewed.    Significant Imaging: I have reviewed all pertinent imaging results/findings within the past 24 hours.      Assessment/Plan:      * Acute pulmonary embolism without acute cor pulmonale  - detected on CT scan  - initiated on therapeutic lovenox  - can likely transition to DOAC if remains stable off O2  - no evidence of right heart strain  - LE US negative for DVT    Pleurodynia  - likely pleuritic due to PE  - trop, EKG negative  - ibuprofen      Postablative hypothyroidism  - continue home synthroid      Multilobar lung infiltrate  - likely infarction due to PE, but possibility remains that this could represent  post-viral CAP  - initiate on rocephin/azithromycin, will continue for now given continued fever      COVID-19 virus detected  - positive test on 7/16/20  - currently appears to be asymptomatic and possible that symptoms are due to PE  - defer advanced therapies for COVID for now given lack of hypoxia or other symptoms    Down syndrome  - will return to group home upon discharge        VTE Risk Mitigation (From admission, onward)         Ordered     enoxaparin injection 70 mg  Every 12 hours (non-standard times)      08/04/20 1915                      Jude Strauss MD  Department of Hospital Medicine   [facility]

## 2020-08-08 NOTE — ASSESSMENT & PLAN NOTE
- likely infarction due to PE, but possibility remains that this could represent post-viral CAP  - initiate on rocephin/azithromycin, will continue for now -> complete 5d course abx on discharge (today is final day)

## 2020-08-08 NOTE — PLAN OF CARE
VN Note: VN cued into patient's room for frequent rounds. He is lying in bed. No acute distress noted.  Patient denies any needs at this time. VN will continue to follow.

## 2020-08-08 NOTE — PLAN OF CARE
VN Note: VN cued into patient's room for frequent rounds. Patient currently on room air. No distress noted. Patient denies any needs at this time. VN will continue to follow.

## 2020-08-08 NOTE — PLAN OF CARE
"VN cued into patients room for rounding - Patient is in no acute distress at this time.  On RA. Call light within reach. Will continue to monitor closely.  Patient instructed to call immediately for SOB.    Pt crying and upset saying "I want to go home". VN attempted to comfort and reassure pt. Pt continuing to cry saying he wants to go home. Pt also saying something else but difficult to understand. Bedside nurses, christina and bunny notified.   "

## 2020-08-08 NOTE — PLAN OF CARE
Q2 hour frequent checks completed.  Patient noted to be resting comfortably. Respirations even and unlabored. No signs of distress noted. All safety measures maintained. Telemetry monitor showing NSR 90bpm.

## 2020-08-08 NOTE — ASSESSMENT & PLAN NOTE
- detected on CT scan  - initiated on therapeutic lovenox  - can transition to DOAC on discharge  - no evidence of right heart strain  - LE US negative for DVT

## 2020-08-08 NOTE — PLAN OF CARE
VN cued into patients room for rounding. Patient is in no acute distress at this time.  Call light within reach. Will continue to monitor closely.

## 2020-08-08 NOTE — SUBJECTIVE & OBJECTIVE
Interval History: Afebrile today. Denies chest pain, shortness of breath, abdominal symptoms. Asks when he can leave the hospital. We are awaiting repeat COVID swab prior to discharge back to group home.    Review of Systems   Constitutional: Negative for fever.   Respiratory: Negative for cough and shortness of breath.    Cardiovascular: Negative for chest pain and leg swelling.   Gastrointestinal: Negative for abdominal pain, diarrhea, nausea and vomiting.   Neurological: Negative for weakness.     Objective:     Vital Signs (Most Recent):  Temp: 97.6 °F (36.4 °C) (08/08/20 0940)  Pulse: 88 (08/08/20 0940)  Resp: 18 (08/08/20 0940)  BP: 116/72 (08/08/20 0940)  SpO2: (!) 93 % (08/08/20 0940) Vital Signs (24h Range):  Temp:  [96.5 °F (35.8 °C)-100.6 °F (38.1 °C)] 97.6 °F (36.4 °C)  Pulse:  [83-98] 88  Resp:  [16-18] 18  SpO2:  [93 %-96 %] 93 %  BP: (110-120)/(63-75) 116/72     Weight: 59.8 kg (131 lb 13.4 oz)  Body mass index is 21.94 kg/m².    Intake/Output Summary (Last 24 hours) at 8/8/2020 1041  Last data filed at 8/8/2020 0600  Gross per 24 hour   Intake 650 ml   Output 1075 ml   Net -425 ml      Physical Exam  Vitals signs reviewed.   Constitutional:       General: He is not in acute distress.     Appearance: He is well-developed. He is not diaphoretic.   HENT:      Head: Atraumatic.      Comments: Down facies     Nose: Nose normal.   Eyes:      General: No scleral icterus.     Pupils: Pupils are equal, round, and reactive to light.   Neck:      Musculoskeletal: Normal range of motion.      Vascular: No JVD.      Trachea: No tracheal deviation.   Pulmonary:      Effort: Pulmonary effort is normal. No respiratory distress.   Abdominal:      General: Abdomen is flat. There is no distension.   Musculoskeletal:         General: No deformity.   Skin:     Findings: No rash.   Neurological:      Mental Status: He is alert and oriented to person, place, and time.   Psychiatric:         Behavior: Behavior normal.          Significant Labs: All pertinent labs within the past 24 hours have been reviewed.    Significant Imaging: I have reviewed all pertinent imaging results/findings within the past 24 hours.

## 2020-08-08 NOTE — PLAN OF CARE
VN Note: VN cued into patient's room for frequent rounds. Patient still on room air. No distress noted. VN will continue to follow.

## 2020-08-08 NOTE — PLAN OF CARE
"VN note: VN cued into patient's room for introduction. Patient in no acute distress. Difficult to understand via monitor. Sound like he is saying he wants to go "home." Allowed time for questions. VN will continue to be available to patient and intervene prn.    "

## 2020-08-08 NOTE — PLAN OF CARE
Plan of care reviewed with patient. NSR on monitor with no red alarms noted. Side rails up x3, bed low, bed alarm on, call bell within reach. Will continue to monitor.

## 2020-08-08 NOTE — SUBJECTIVE & OBJECTIVE
Interval History: Seen via [employer] telemedicine platform: I was available outside the room calling from computer with headphones. Does report some mild chest pain, worse with breathing. States pain medicine is helping. Cardiac workup yesterday negative. Likely pleuritic.    Review of Systems   Constitutional: Positive for fever.   Respiratory: Negative for cough and shortness of breath.    Cardiovascular: Negative for chest pain and leg swelling.   Gastrointestinal: Negative for abdominal pain, diarrhea, nausea and vomiting.   Neurological: Negative for weakness.     Objective:     Vital Signs (Most Recent):  Temp: 96.5 °F (35.8 °C) (08/07/20 1420)  Pulse: 85 (08/07/20 1558)  Resp: 18 (08/07/20 1420)  BP: 110/63 (08/07/20 1420)  SpO2: 98 % (08/07/20 0841) Vital Signs (24h Range):  Temp:  [96.5 °F (35.8 °C)-101.2 °F (38.4 °C)] 96.5 °F (35.8 °C)  Pulse:  [] 85  Resp:  [16-18] 18  SpO2:  [94 %-98 %] 98 %  BP: (110-120)/(61-74) 110/63     Weight: 59.8 kg (131 lb 13.4 oz)  Body mass index is 21.94 kg/m².    Intake/Output Summary (Last 24 hours) at 8/7/2020 1916  Last data filed at 8/7/2020 1700  Gross per 24 hour   Intake 847 ml   Output 925 ml   Net -78 ml      Physical Exam  Vitals signs reviewed.   Constitutional:       General: He is not in acute distress.     Appearance: He is well-developed. He is not diaphoretic.   HENT:      Head: Atraumatic.      Comments: Down facies     Nose: Nose normal.   Eyes:      General: No scleral icterus.     Pupils: Pupils are equal, round, and reactive to light.   Neck:      Musculoskeletal: Normal range of motion.      Vascular: No JVD.      Trachea: No tracheal deviation.   Pulmonary:      Effort: Pulmonary effort is normal. No respiratory distress.   Abdominal:      General: Abdomen is flat. There is no distension.   Musculoskeletal:         General: No deformity.   Skin:     Findings: No rash.   Neurological:      Mental Status: He is alert and oriented to person, place, and  time.   Psychiatric:         Behavior: Behavior normal.         Significant Labs: All pertinent labs within the past 24 hours have been reviewed.    Significant Imaging: I have reviewed all pertinent imaging results/findings within the past 24 hours.

## 2020-08-09 PROBLEM — Z86.16 HISTORY OF 2019 NOVEL CORONAVIRUS DISEASE (COVID-19): Status: ACTIVE | Noted: 2020-08-04

## 2020-08-09 LAB
ANION GAP SERPL CALC-SCNC: 8 MMOL/L (ref 8–16)
BACTERIA BLD CULT: NORMAL
BACTERIA BLD CULT: NORMAL
BASOPHILS # BLD AUTO: 0.05 K/UL (ref 0–0.2)
BASOPHILS NFR BLD: 0.4 % (ref 0–1.9)
BUN SERPL-MCNC: 17 MG/DL (ref 8–23)
CALCIUM SERPL-MCNC: 8.9 MG/DL (ref 8.7–10.5)
CHLORIDE SERPL-SCNC: 104 MMOL/L (ref 95–110)
CO2 SERPL-SCNC: 30 MMOL/L (ref 23–29)
CREAT SERPL-MCNC: 0.8 MG/DL (ref 0.5–1.4)
DIFFERENTIAL METHOD: ABNORMAL
EOSINOPHIL # BLD AUTO: 0.1 K/UL (ref 0–0.5)
EOSINOPHIL NFR BLD: 0.4 % (ref 0–8)
ERYTHROCYTE [DISTWIDTH] IN BLOOD BY AUTOMATED COUNT: 15.2 % (ref 11.5–14.5)
EST. GFR  (AFRICAN AMERICAN): >60 ML/MIN/1.73 M^2
EST. GFR  (NON AFRICAN AMERICAN): >60 ML/MIN/1.73 M^2
GLUCOSE SERPL-MCNC: 93 MG/DL (ref 70–110)
HCT VFR BLD AUTO: 35.9 % (ref 40–54)
HGB BLD-MCNC: 12.1 G/DL (ref 14–18)
IMM GRANULOCYTES # BLD AUTO: 0.24 K/UL (ref 0–0.04)
IMM GRANULOCYTES NFR BLD AUTO: 1.7 % (ref 0–0.5)
LYMPHOCYTES # BLD AUTO: 2.2 K/UL (ref 1–4.8)
LYMPHOCYTES NFR BLD: 15.5 % (ref 18–48)
MAGNESIUM SERPL-MCNC: 2.3 MG/DL (ref 1.6–2.6)
MCH RBC QN AUTO: 31 PG (ref 27–31)
MCHC RBC AUTO-ENTMCNC: 33.7 G/DL (ref 32–36)
MCV RBC AUTO: 92 FL (ref 82–98)
MONOCYTES # BLD AUTO: 1.7 K/UL (ref 0.3–1)
MONOCYTES NFR BLD: 11.9 % (ref 4–15)
NEUTROPHILS # BLD AUTO: 9.7 K/UL (ref 1.8–7.7)
NEUTROPHILS NFR BLD: 70.1 % (ref 38–73)
NRBC BLD-RTO: 0 /100 WBC
PLATELET # BLD AUTO: 209 K/UL (ref 150–350)
PMV BLD AUTO: 12.1 FL (ref 9.2–12.9)
POCT GLUCOSE: 96 MG/DL (ref 70–110)
POTASSIUM SERPL-SCNC: 3.9 MMOL/L (ref 3.5–5.1)
RBC # BLD AUTO: 3.9 M/UL (ref 4.6–6.2)
SODIUM SERPL-SCNC: 142 MMOL/L (ref 136–145)
WBC # BLD AUTO: 13.83 K/UL (ref 3.9–12.7)

## 2020-08-09 PROCEDURE — 85025 COMPLETE CBC W/AUTO DIFF WBC: CPT

## 2020-08-09 PROCEDURE — 83735 ASSAY OF MAGNESIUM: CPT

## 2020-08-09 PROCEDURE — 99900035 HC TECH TIME PER 15 MIN (STAT)

## 2020-08-09 PROCEDURE — 25000003 PHARM REV CODE 250: Performed by: HOSPITALIST

## 2020-08-09 PROCEDURE — 36415 COLL VENOUS BLD VENIPUNCTURE: CPT

## 2020-08-09 PROCEDURE — 11000001 HC ACUTE MED/SURG PRIVATE ROOM

## 2020-08-09 PROCEDURE — 80048 BASIC METABOLIC PNL TOTAL CA: CPT

## 2020-08-09 RX ADMIN — ATORVASTATIN CALCIUM 40 MG: 40 TABLET, FILM COATED ORAL at 08:08

## 2020-08-09 RX ADMIN — OLOPATADINE HYDROCHLORIDE 1 DROP: 1 SOLUTION/ DROPS OPHTHALMIC at 09:08

## 2020-08-09 RX ADMIN — APIXABAN 10 MG: 5 TABLET, FILM COATED ORAL at 08:08

## 2020-08-09 RX ADMIN — POLYETHYLENE GLYCOL (3350) 17 G: 17 POWDER, FOR SOLUTION ORAL at 08:08

## 2020-08-09 RX ADMIN — LEVOTHYROXINE SODIUM 75 MCG: 75 TABLET ORAL at 08:08

## 2020-08-09 RX ADMIN — THERA TABS 1 TABLET: TAB at 08:08

## 2020-08-09 RX ADMIN — CALCIUM CARBONATE (ANTACID) CHEW TAB 500 MG 500 MG: 500 CHEW TAB at 08:08

## 2020-08-09 RX ADMIN — PANTOPRAZOLE SODIUM 40 MG: 40 TABLET, DELAYED RELEASE ORAL at 08:08

## 2020-08-09 RX ADMIN — OXYCODONE HYDROCHLORIDE AND ACETAMINOPHEN 500 MG: 500 TABLET ORAL at 08:08

## 2020-08-09 RX ADMIN — OLOPATADINE HYDROCHLORIDE 1 DROP: 1 SOLUTION/ DROPS OPHTHALMIC at 08:08

## 2020-08-09 NOTE — ASSESSMENT & PLAN NOTE
- detected on CT scan  - initiated on therapeutic lovenox; changed to eliquis 10mg bid today  - transition to DOAC on discharge: will need 2 more days of 10mg bid dosing eliquis then 5mg bid x90d for COVID associated PE  - no evidence of right heart strain  - LE US negative for DVT

## 2020-08-09 NOTE — PLAN OF CARE
"VN note: VN cued into patient's room for introduction. Patient's parks removed this am. I reminded him to call for any assistance and if needing to "urinate." Unsure how much education patient is retaining. Difficult to understand via monitor. VN will continue to be available to patient and intervene prn.    "

## 2020-08-09 NOTE — ASSESSMENT & PLAN NOTE
- positive test on 7/16/20  - currently appears to be asymptomatic and possible that symptoms are due to PE  - defer advanced therapies for COVID for now given lack of hypoxia or other symptoms  - 2nd COVID test negative

## 2020-08-09 NOTE — NURSING
Bladder scan performed to patient with 78ml urine residual noted. Dr. Strauss aware. No acute distress noted at this time and will continue to monitor.

## 2020-08-09 NOTE — PLAN OF CARE
Problem: Adult Inpatient Plan of Care  Goal: Plan of Care Review  Description: Chart and Care plan reviewed.    Outcome: Ongoing, Progressing

## 2020-08-09 NOTE — ASSESSMENT & PLAN NOTE
- likely infarction due to PE, but possibility remains that this could represent post-viral CAP  - initiate on rocephin/azithromycin -> completed 5d course abx

## 2020-08-09 NOTE — PROGRESS NOTES
[facility] Hospital Medicine  Progress Note    Patient Name: Ernie Davila  MRN: [id number]  Patient Class: IP- Inpatient   Admission Date: 8/4/2020  Length of Stay: 5 days  Attending Physician: Jude Strauss, *  Primary Care Provider: To Obtain Unable        Subjective:     Principal Problem:Acute pulmonary embolism without acute cor pulmonale        HPI:  Mr. Davila is a 60yo man with Down syndrome and hypothyroidism who lives in a group home who presented to the ED with shortness of breath and fever. He was recently diagnosed with COVID on 7/16/20 and was quarantined at the [facility]. He returned to residential after resolution of symptoms, but on day of admission he again developed fever and shortness of breath. To me, he denies any shortness of breath, chest discomfort, cough, fevers, chills, GI symptoms. States that he feels basically normal at this time.    In the ED, he had temp 100.4, p 100, /74. WBC 13, ferritin 1931, D-dimer 9.36, , trop and BNP wnl, procal negative. CTA with bilateral pulmonary emboli and GGO concerning for infarct vs infection.    Overview/Hospital Course:  No notes on file    Interval History: Seen via [employer] telemedicine platform: I was available outside the room calling from computer with headphones. Feeling well today, excited to leave the hospital. Repeat COVID test negative; can d/c precautions. Suspect fevers 2/2 PE and lung ischemia rather than infection. Unfortunately, not able to discharge to group home today but can go back tomorrow am.    Review of Systems   Constitutional: Negative for fever.   Respiratory: Negative for cough and shortness of breath.    Cardiovascular: Negative for chest pain and leg swelling.   Gastrointestinal: Negative for abdominal pain, diarrhea, nausea and vomiting.   Neurological: Negative for weakness.     Objective:     Vital Signs (Most Recent):  Temp: 98.7 °F (37.1 °C) (08/09/20 0441)  Pulse: 85  (08/09/20 0800)  Resp: 18 (08/09/20 0441)  BP: 111/78 (08/09/20 0441)  SpO2: 95 % (08/09/20 0441) Vital Signs (24h Range):  Temp:  [97.9 °F (36.6 °C)-98.7 °F (37.1 °C)] 98.7 °F (37.1 °C)  Pulse:  [81-89] 85  Resp:  [18-20] 18  SpO2:  [95 %] 95 %  BP: (111-157)/(75-78) 111/78     Weight: 61.6 kg (135 lb 12.9 oz)  Body mass index is 22.6 kg/m².    Intake/Output Summary (Last 24 hours) at 8/9/2020 1137  Last data filed at 8/9/2020 0720  Gross per 24 hour   Intake 150 ml   Output 600 ml   Net -450 ml      Physical Exam  Vitals signs reviewed.   Constitutional:       General: He is not in acute distress.     Appearance: He is well-developed. He is not diaphoretic.   HENT:      Head: Atraumatic.      Comments: Down facies     Nose: Nose normal.   Eyes:      General: No scleral icterus.     Pupils: Pupils are equal, round, and reactive to light.   Neck:      Musculoskeletal: Normal range of motion.      Vascular: No JVD.      Trachea: No tracheal deviation.   Pulmonary:      Effort: Pulmonary effort is normal. No respiratory distress.   Abdominal:      General: Abdomen is flat. There is no distension.   Musculoskeletal:         General: No deformity.   Skin:     Findings: No rash.   Neurological:      Mental Status: He is alert and oriented to person, place, and time.   Psychiatric:         Behavior: Behavior normal.         Significant Labs: All pertinent labs within the past 24 hours have been reviewed.    Significant Imaging: I have reviewed all pertinent imaging results/findings within the past 24 hours.      Assessment/Plan:      * Acute pulmonary embolism without acute cor pulmonale  - detected on CT scan  - initiated on therapeutic lovenox; changed to eliquis 10mg bid today  - transition to DOAC on discharge: will need 2 more days of 10mg bid dosing eliquis then 5mg bid x90d for COVID associated PE  - no evidence of right heart strain  - LE US negative for DVT    Pleurodynia  - likely pleuritic due to PE  - trop,  EKG negative  - ibuprofen      Postablative hypothyroidism  - continue home synthroid      Multilobar lung infiltrate  - likely infarction due to PE, but possibility remains that this could represent post-viral CAP  - initiate on rocephin/azithromycin -> completed 5d course abx      History of 2019 novel coronavirus disease (COVID-19)  - positive test on 7/16/20  - currently appears to be asymptomatic and possible that symptoms are due to PE  - defer advanced therapies for COVID for now given lack of hypoxia or other symptoms  - 2nd COVID test negative    Down syndrome  - will return to group home upon discharge        VTE Risk Mitigation (From admission, onward)         Ordered     apixaban tablet 5 mg  2 times daily      08/09/20 0728     apixaban tablet 10 mg  2 times daily      08/09/20 0728                      Jude Strauss MD  Department of Hospital Medicine   [facility]

## 2020-08-09 NOTE — PLAN OF CARE
1910H Patient is awake, alert and oriented to name only. Maintained on contact, airborne and droplet isolation for COVID-19. Care plan explained, no evidence of learning. VIP care model explained. On room air, O2 saturation 96%. On heart monitoring running Sinus Rhythm, heart rate 80s. IV site to the right antecubital fossa and left hand 20G; flushed and saline locked. Fr.14 parks catheter to urimeter, draining yellow urine. Maintained on regular diet. Maintained on fall precaution. Bed in lowest position, bed alarms on, call light within reach and instructed to call for help when needed.      Due medications given. Swallows pill good.

## 2020-08-09 NOTE — SUBJECTIVE & OBJECTIVE
Interval History: Seen via [employer] telemedicine platform: I was available outside the room calling from computer with headphones. Feeling well today, excited to leave the hospital. Repeat COVID test negative; can d/c precautions. Suspect fevers 2/2 PE and lung ischemia rather than infection. Unfortunately, not able to discharge to group home today but can go back tomorrow am.    Review of Systems   Constitutional: Negative for fever.   Respiratory: Negative for cough and shortness of breath.    Cardiovascular: Negative for chest pain and leg swelling.   Gastrointestinal: Negative for abdominal pain, diarrhea, nausea and vomiting.   Neurological: Negative for weakness.     Objective:     Vital Signs (Most Recent):  Temp: 98.7 °F (37.1 °C) (08/09/20 0441)  Pulse: 85 (08/09/20 0800)  Resp: 18 (08/09/20 0441)  BP: 111/78 (08/09/20 0441)  SpO2: 95 % (08/09/20 0441) Vital Signs (24h Range):  Temp:  [97.9 °F (36.6 °C)-98.7 °F (37.1 °C)] 98.7 °F (37.1 °C)  Pulse:  [81-89] 85  Resp:  [18-20] 18  SpO2:  [95 %] 95 %  BP: (111-157)/(75-78) 111/78     Weight: 61.6 kg (135 lb 12.9 oz)  Body mass index is 22.6 kg/m².    Intake/Output Summary (Last 24 hours) at 8/9/2020 1137  Last data filed at 8/9/2020 0720  Gross per 24 hour   Intake 150 ml   Output 600 ml   Net -450 ml      Physical Exam  Vitals signs reviewed.   Constitutional:       General: He is not in acute distress.     Appearance: He is well-developed. He is not diaphoretic.   HENT:      Head: Atraumatic.      Comments: Down facies     Nose: Nose normal.   Eyes:      General: No scleral icterus.     Pupils: Pupils are equal, round, and reactive to light.   Neck:      Musculoskeletal: Normal range of motion.      Vascular: No JVD.      Trachea: No tracheal deviation.   Pulmonary:      Effort: Pulmonary effort is normal. No respiratory distress.   Abdominal:      General: Abdomen is flat. There is no distension.   Musculoskeletal:         General: No deformity.   Skin:      Findings: No rash.   Neurological:      Mental Status: He is alert and oriented to person, place, and time.   Psychiatric:         Behavior: Behavior normal.         Significant Labs: All pertinent labs within the past 24 hours have been reviewed.    Significant Imaging: I have reviewed all pertinent imaging results/findings within the past 24 hours.

## 2020-08-10 VITALS
SYSTOLIC BLOOD PRESSURE: 136 MMHG | DIASTOLIC BLOOD PRESSURE: 86 MMHG | HEART RATE: 77 BPM | HEIGHT: 65 IN | OXYGEN SATURATION: 97 % | WEIGHT: 135.81 LBS | RESPIRATION RATE: 18 BRPM | TEMPERATURE: 98 F | BODY MASS INDEX: 22.63 KG/M2

## 2020-08-10 PROBLEM — R33.9 URINARY RETENTION: Status: ACTIVE | Noted: 2020-08-10

## 2020-08-10 LAB
ANION GAP SERPL CALC-SCNC: 6 MMOL/L (ref 8–16)
BASOPHILS # BLD AUTO: 0.04 K/UL (ref 0–0.2)
BASOPHILS NFR BLD: 0.4 % (ref 0–1.9)
BUN SERPL-MCNC: 16 MG/DL (ref 8–23)
CALCIUM SERPL-MCNC: 9 MG/DL (ref 8.7–10.5)
CHLORIDE SERPL-SCNC: 104 MMOL/L (ref 95–110)
CO2 SERPL-SCNC: 33 MMOL/L (ref 23–29)
CREAT SERPL-MCNC: 0.9 MG/DL (ref 0.5–1.4)
DIFFERENTIAL METHOD: ABNORMAL
EOSINOPHIL # BLD AUTO: 0.1 K/UL (ref 0–0.5)
EOSINOPHIL NFR BLD: 0.6 % (ref 0–8)
ERYTHROCYTE [DISTWIDTH] IN BLOOD BY AUTOMATED COUNT: 15.2 % (ref 11.5–14.5)
EST. GFR  (AFRICAN AMERICAN): >60 ML/MIN/1.73 M^2
EST. GFR  (NON AFRICAN AMERICAN): >60 ML/MIN/1.73 M^2
GLUCOSE SERPL-MCNC: 104 MG/DL (ref 70–110)
HCT VFR BLD AUTO: 36.9 % (ref 40–54)
HGB BLD-MCNC: 12.3 G/DL (ref 14–18)
IMM GRANULOCYTES # BLD AUTO: 0.35 K/UL (ref 0–0.04)
IMM GRANULOCYTES NFR BLD AUTO: 3.1 % (ref 0–0.5)
LYMPHOCYTES # BLD AUTO: 1.4 K/UL (ref 1–4.8)
LYMPHOCYTES NFR BLD: 12.3 % (ref 18–48)
MAGNESIUM SERPL-MCNC: 2.3 MG/DL (ref 1.6–2.6)
MCH RBC QN AUTO: 31.4 PG (ref 27–31)
MCHC RBC AUTO-ENTMCNC: 33.3 G/DL (ref 32–36)
MCV RBC AUTO: 94 FL (ref 82–98)
MONOCYTES # BLD AUTO: 1.4 K/UL (ref 0.3–1)
MONOCYTES NFR BLD: 12.3 % (ref 4–15)
NEUTROPHILS # BLD AUTO: 8.1 K/UL (ref 1.8–7.7)
NEUTROPHILS NFR BLD: 71.3 % (ref 38–73)
NRBC BLD-RTO: 0 /100 WBC
PLATELET # BLD AUTO: 250 K/UL (ref 150–350)
PMV BLD AUTO: 11.5 FL (ref 9.2–12.9)
POTASSIUM SERPL-SCNC: 3.7 MMOL/L (ref 3.5–5.1)
RBC # BLD AUTO: 3.92 M/UL (ref 4.6–6.2)
SODIUM SERPL-SCNC: 143 MMOL/L (ref 136–145)
WBC # BLD AUTO: 11.38 K/UL (ref 3.9–12.7)

## 2020-08-10 PROCEDURE — 85025 COMPLETE CBC W/AUTO DIFF WBC: CPT

## 2020-08-10 PROCEDURE — 36415 COLL VENOUS BLD VENIPUNCTURE: CPT

## 2020-08-10 PROCEDURE — 83735 ASSAY OF MAGNESIUM: CPT

## 2020-08-10 PROCEDURE — 25000003 PHARM REV CODE 250: Performed by: HOSPITALIST

## 2020-08-10 PROCEDURE — 80048 BASIC METABOLIC PNL TOTAL CA: CPT

## 2020-08-10 RX ORDER — TAMSULOSIN HYDROCHLORIDE 0.4 MG/1
0.4 CAPSULE ORAL DAILY
Qty: 30 CAPSULE | Refills: 11 | Status: SHIPPED | OUTPATIENT
Start: 2020-08-10 | End: 2021-08-10

## 2020-08-10 RX ORDER — TAMSULOSIN HYDROCHLORIDE 0.4 MG/1
0.4 CAPSULE ORAL DAILY
Qty: 30 CAPSULE | Refills: 11
Start: 2020-08-10 | End: 2020-08-10

## 2020-08-10 RX ORDER — TAMSULOSIN HYDROCHLORIDE 0.4 MG/1
0.4 CAPSULE ORAL DAILY
Status: DISCONTINUED | OUTPATIENT
Start: 2020-08-10 | End: 2020-08-10 | Stop reason: HOSPADM

## 2020-08-10 RX ADMIN — APIXABAN 10 MG: 5 TABLET, FILM COATED ORAL at 08:08

## 2020-08-10 RX ADMIN — OLOPATADINE HYDROCHLORIDE 1 DROP: 1 SOLUTION/ DROPS OPHTHALMIC at 09:08

## 2020-08-10 RX ADMIN — LEVOTHYROXINE SODIUM 75 MCG: 75 TABLET ORAL at 08:08

## 2020-08-10 RX ADMIN — THERA TABS 1 TABLET: TAB at 08:08

## 2020-08-10 RX ADMIN — TAMSULOSIN HYDROCHLORIDE 0.4 MG: 0.4 CAPSULE ORAL at 08:08

## 2020-08-10 RX ADMIN — PANTOPRAZOLE SODIUM 40 MG: 40 TABLET, DELAYED RELEASE ORAL at 08:08

## 2020-08-10 RX ADMIN — OXYCODONE HYDROCHLORIDE AND ACETAMINOPHEN 500 MG: 500 TABLET ORAL at 08:08

## 2020-08-10 RX ADMIN — CALCIUM CARBONATE (ANTACID) CHEW TAB 500 MG 500 MG: 500 CHEW TAB at 08:08

## 2020-08-10 NOTE — PLAN OF CARE
VN note: VN completed AVS and attachments and notified bedside nurseAnnie. Will cont to be available and intervene prn.

## 2020-08-10 NOTE — PLAN OF CARE
CM aware pt d/c ready.  Informed pt had urinary retention over weekend, parks cath inserted.  Parks has been d/c'd and waiting for pt to void.  CM communicated with Mr Kowalski (), informed that pt cannot return to group home with parks cath in place per state protocol.  Informed Mr Kowalski will keep him updated, 8 hrs from parks d/c is 1PM.  CM will assist on placement option if parks needed although this is less desired (pt would benefit from returning to normal dwelling due to Down Syndrome dx).  CM will continue to follow.  D/C summary, labs, radiology studies faxed to Mr Kowalski as requested.  Rx to be faxed to [facility] [phone].    Margoth Tovar RN    [phone]

## 2020-08-10 NOTE — NURSING
Patient has no urine output since shift change. Bladder scan showed 277ml, LUIS Bender notified. Will recheck later.

## 2020-08-10 NOTE — PLAN OF CARE
Care plan reviewed with pt. Pt is disoriented to place, time, and situation but able to follow commands. Cardiac monitoring In place. Medications administered as ordered. Safety maintained, bed at lowest position, wheels locked, bed alarm on, call light within reach.

## 2020-08-10 NOTE — PLAN OF CARE
Patient stable. Patient awake, alert and oriented to self only. Plan of care reviewed with patient. No evidence of learning. NSR on tele monitoring with no red alarms noted. Airborne/contact/droplet isolation precaution maintained. No falls throughout the shift. Due meds given per ordered. Swallows pills good. No complaint of pian or SOB. No acute distress noted. Safety maintained. Bed in lowest position. Bed alarm on. Head of bed elevated.  Side rails x 2 are up. Call bell within reach and educated to call for assistance. Telesitter at bedside. Patient will be monitored overnight.

## 2020-08-10 NOTE — DISCHARGE INSTRUCTIONS
Embolism, Pulmonary (English) View Edit Remove   Pulmonary Embolism, Discharge Instructions for (English) View Edit Remove   Apixaban oral tablets (English) View Edit Remove

## 2020-08-10 NOTE — DISCHARGE SUMMARY
"[facility] Hospital Medicine  Discharge Summary      Patient Name: Ernie Davila  MRN: [id number]  Admission Date: 8/4/2020  Hospital Length of Stay: 6 days  Discharge Date and Time:  08/10/2020 8:23 AM  Attending Physician: Jude Strauss, *   Discharging Provider: Jude Strauss MD  Primary Care Provider: To Obtain Unable      HPI:   Mr. Davila is a 60yo man with Down syndrome and hypothyroidism who lives in a group home who presented to the ED with shortness of breath and fever. He was recently diagnosed with COVID on 7/16/20 and was quarantined at the [facility]. He returned to [facility] after resolution of symptoms, but on day of admission he again developed fever and shortness of breath. To me, he denies any shortness of breath, chest discomfort, cough, fevers, chills, GI symptoms. States that he feels basically normal at this time.    In the ED, he had temp 100.4, p 100, /74. WBC 13, ferritin 1931, D-dimer 9.36, , trop and BNP wnl, procal negative. CTA with bilateral pulmonary emboli and GGO concerning for infarct vs infection.    * No surgery found *      Hospital Course:   Mr. Davila presented with fever from his group home. Found to have bilateral Pes on CT scan upon admission. Initiated on IV antibiotics (in case of complicating PNA) and lovenox. He never required supplemental O2. Fevers improved, and he was transitioned from lovenox to eliquis. Will complete 90d course for COVID related PE. Of note, he had tested positive for COVID in July and quarantined with resolution of symptoms. Repeat test here negative.    Course complicated by urinary retention with catheter placed. Will need tamsulosin and urology f/u.    /67 (BP Location: Left arm, Patient Position: Lying)   Pulse 80   Temp 97.3 °F (36.3 °C) (Oral)   Resp 18   Ht 5' 5" (1.651 m)   Wt 61.6 kg (135 lb 12.9 oz)   SpO2 (!) 93%   BMI 22.60 kg/m²   Physical Exam  Vitals signs " reviewed.   Constitutional:       General: He is not in acute distress.     Appearance: He is well-developed. He is not diaphoretic.   HENT:      Head: Atraumatic.      Comments: Down facies     Nose: Nose normal.   Eyes:      General: No scleral icterus.     Pupils: Pupils are equal, round, and reactive to light.   Neck:      Musculoskeletal: Normal range of motion.      Vascular: No JVD.      Trachea: No tracheal deviation.   Pulmonary:      Effort: Pulmonary effort is normal. No respiratory distress.   Abdominal:      General: Abdomen is flat. There is no distension.   Musculoskeletal:         General: No deformity.   Skin:     Findings: No rash.   Neurological:      Mental Status: He is alert and oriented to person, place, and time.   Psychiatric:         Behavior: Behavior normal.      Consults:     * Acute pulmonary embolism without acute cor pulmonale  - detected on CT scan  - initiated on therapeutic lovenox; changed to eliquis  - transition to DOAC on discharge: will need 2 more days of 10mg bid dosing eliquis then 5mg bid x90d for COVID associated PE  - no evidence of right heart strain  - LE US negative for DVT    Urinary retention  - parks placed  - initiate tamsulosin  - will need Urology f/u      Pleurodynia  - likely pleuritic due to PE  - trop, EKG negative  - ibuprofen      Postablative hypothyroidism  - continue home synthroid      Multilobar lung infiltrate  - likely infarction due to PE, but possibility remains that this could represent post-viral CAP  - initiate on rocephin/azithromycin -> completed 5d course abx      History of 2019 novel coronavirus disease (COVID-19)  - positive test on 7/16/20  - currently appears to be asymptomatic and possible that symptoms are due to PE  - defer advanced therapies for COVID for now given lack of hypoxia or other symptoms  - 2nd COVID test negative    Down syndrome  - will return to group home upon discharge        Final Active Diagnoses:    Diagnosis Date  Noted POA    PRINCIPAL PROBLEM:  Acute pulmonary embolism without acute cor pulmonale [I26.99] 08/04/2020 Yes    Urinary retention [R33.9] 08/10/2020 Yes    Pleurodynia [R07.81] 08/06/2020 Yes    History of 2019 novel coronavirus disease (COVID-19) [Z86.19] 08/04/2020 Yes    Multilobar lung infiltrate [R91.8] 08/04/2020 Yes    Postablative hypothyroidism [E89.0] 08/04/2020 Yes    Down syndrome [Q90.9]  Not Applicable     Chronic      Problems Resolved During this Admission:       Discharged Condition: good    Disposition: Home or Self Care    Follow Up:    Patient Instructions:      Ambulatory referral/consult to Urology   Standing Status: Future   Referral Priority: Routine Referral Type: Consultation   Referral Reason: Specialty Services Required   Requested Specialty: Urology   Number of Visits Requested: 1     Diet Adult Regular     Diet Adult Regular     Notify your health care provider if you experience any of the following:  difficulty breathing or increased cough     Notify your health care provider if you experience any of the following:  difficulty breathing or increased cough     Activity as tolerated       Significant Diagnostic Studies: Labs:   CMP   Recent Labs   Lab 08/09/20  0548 08/10/20  0705    143   K 3.9 3.7    104   CO2 30* 33*   GLU 93 104   BUN 17 16   CREATININE 0.8 0.9   CALCIUM 8.9 9.0   ANIONGAP 8 6*   ESTGFRAFRICA >60 >60   EGFRNONAA >60 >60   , CBC   Recent Labs   Lab 08/09/20  0548 08/10/20  0705   WBC 13.83* 11.38   HGB 12.1* 12.3*   HCT 35.9* 36.9*    250   , INR No results found for: INR, PROTIME, Lipid Panel No results found for: CHOL, HDL, LDLCALC, TRIG, CHOLHDL, Troponin   Recent Labs   Lab 08/06/20  1858   TROPONINI 0.011   , A1C: No results for input(s): HGBA1C in the last 4320 hours. and All labs within the past 24 hours have been reviewed  Radiology: CT scan: as above    Pending Diagnostic Studies:     None         Medications:  Reconciled Home  Medications:      Medication List      START taking these medications    * apixaban 5 mg Tab  Commonly known as: ELIQUIS  Take 2 tablets (10 mg total) by mouth 2 (two) times daily. for 2 days     * apixaban 5 mg Tab  Commonly known as: ELIQUIS  Take 1 tablet (5 mg total) by mouth 2 (two) times daily.  Start taking on: August 11, 2020     tamsulosin 0.4 mg Cap  Commonly known as: FLOMAX  Take 1 capsule (0.4 mg total) by mouth once daily.         * This list has 2 medication(s) that are the same as other medications prescribed for you. Read the directions carefully, and ask your doctor or other care provider to review them with you.            CONTINUE taking these medications    calcium carbonate 600 mg calcium (1,500 mg) Tab  Commonly known as: OS-KARLI  Take 600 mg by mouth 2 (two) times daily with meals.     levothyroxine 50 MCG tablet  Commonly known as: SYNTHROID  Take 75 mcg by mouth once daily.     multivitamin per tablet  Commonly known as: THERAGRAN  Take 1 tablet by mouth once daily.     olopatadine 0.1 % ophthalmic solution  Commonly known as: PATANOL  1 drop 2 (two) times daily.     omeprazole 20 MG capsule  Commonly known as: PRILOSEC  Take 20 mg by mouth once daily.     ranitidine 15 mg/mL syrup  Commonly known as: ZANTAC  Take 5 mLs (75 mg total) by mouth 2 (two) times daily as needed for Heartburn.     VITAMIN C 500 MG tablet  Generic drug: ascorbic acid (vitamin C)  Take 500 mg by mouth 2 (two) times daily.            Indwelling Lines/Drains at time of discharge:   Lines/Drains/Airways     None                 Time spent on the discharge of patient: 35 minutes  Patient was seen and examined on the date of discharge and determined to be suitable for discharge.         Jude Strauss MD  Department of Hospital Medicine  [facility]

## 2020-08-10 NOTE — PLAN OF CARE
VN cued into the room.  The patient is yelling at the nurse asking where his money is.  He is difficult to understand.

## 2020-08-10 NOTE — NURSING
Pt discharged to home group. Spoke to Mr. Kowalski, nurse at facility to give report. Scripts given to transport staff member to bring to facility with pt. Cardiac monitor removed. IV removed.

## 2020-08-10 NOTE — HOSPITAL COURSE
"Mr. Davila presented with fever from his group home. Found to have bilateral Pes on CT scan upon admission. Initiated on IV antibiotics (in case of complicating PNA) and lovenox. He never required supplemental O2. Fevers improved, and he was transitioned from lovenox to eliquis. Will complete 90d course for COVID related PE. Of note, he had tested positive for COVID in July and quarantined with resolution of symptoms. Repeat test here negative.    Course complicated by urinary retention with catheter placed. Will need tamsulosin and urology f/u.    /67 (BP Location: Left arm, Patient Position: Lying)   Pulse 80   Temp 97.3 °F (36.3 °C) (Oral)   Resp 18   Ht 5' 5" (1.651 m)   Wt 61.6 kg (135 lb 12.9 oz)   SpO2 (!) 93%   BMI 22.60 kg/m²   Physical Exam  Vitals signs reviewed.   Constitutional:       General: He is not in acute distress.     Appearance: He is well-developed. He is not diaphoretic.   HENT:      Head: Atraumatic.      Comments: Down facies     Nose: Nose normal.   Eyes:      General: No scleral icterus.     Pupils: Pupils are equal, round, and reactive to light.   Neck:      Musculoskeletal: Normal range of motion.      Vascular: No JVD.      Trachea: No tracheal deviation.   Pulmonary:      Effort: Pulmonary effort is normal. No respiratory distress.   Abdominal:      General: Abdomen is flat. There is no distension.   Musculoskeletal:         General: No deformity.   Skin:     Findings: No rash.   Neurological:      Mental Status: He is alert and oriented to person, place, and time.   Psychiatric:         Behavior: Behavior normal.   "

## 2020-08-10 NOTE — ASSESSMENT & PLAN NOTE
- detected on CT scan  - initiated on therapeutic lovenox; changed to eliquis  - transition to DOAC on discharge: will need 2 more days of 10mg bid dosing eliquis then 5mg bid x90d for COVID associated PE  - no evidence of right heart strain  - LE US negative for DVT

## 2020-08-10 NOTE — PLAN OF CARE
1915H Patient is awake, alert and oriented to name and place only. isolation precaution for COVID-19 discontinued. Care plan explained, no evidence of learning. VIP care model explained. On room air, O2 saturation 94%. On heart monitoring running Sinus Rhythm, heart rate 80s. IV site to the right antecubital fossa 20G; flushed and saline locked. Diaper on. Maintained on regular diet. Maintained on fall precaution. Bed in lowest position, bed alarms on, call light within reach and instructed to call for help when needed.      Due medications given. Swallows pill good. Ate mashed potato and meat.

## 2020-08-11 NOTE — PLAN OF CARE
08/11/20 1648   Final Note   Assessment Type Final Discharge Note   Anticipated Discharge Disposition Home   Hospital Follow Up  Appt(s) scheduled? Yes   Discharge plans and expectations educations in teach back method with documentation complete? Yes   Right Care Referral Info   Post Acute Recommendation No Care   Post-Acute Status   Discharge Delays None known at this time

## 2020-08-11 NOTE — PHYSICIAN QUERY
"PT Name: Ernie Davila  MR #: [id number]     PNEUMONIA CLARIFICATION     CDS: Sara Huber RN, CCDS         Contact information :ext [phone] [email or web address]     This form is a permanent document in the medical record.    Query Date: August 11, 2020    By submitting this query, we are merely seeking further clarification of documentation.  Please utilize your independent clinical judgment when addressing the question(s) below.    The Medical Record contains the following:   Indicators   Supporting Clinical Findings Location in Medical Record   x Pneumonia documented Multilobar lung infiltrate  - likely infarction due to PE, but possibility remains that this could represent post-viral CAP    Multilobar lung infiltrate  - likely infarction due to PE, but possibility remains that this could represent post-viral CAP  - initiate on rocephin/azithromycin -> completed 5d course abx H&P 8/4/20        Discharge summary    x Chest X-Ray Patchy increased attenuation in the mid and lower lung zones suggestive of nonspecific infectious or inflammatory process in the setting of COVID-19.  Other considerations include mild pulmonary edema or sequela of aspiration. CXR 8/4/20    PaO2    PaCO2     O2 sat      Cultures      x Treatment  - initiate on rocephin/azithromycin for now  - check procal and de-escalate rapidly H&P 8/4/20    Supplemental O2      Dysphagia/Swallow study     x Other COVID positive on 7/18 with fever, low O2 saturations, low BP per EMS. Patient is from [address]. Discharged from [facility] yesterday.   He returned to [facility] after resolution of symptoms, but on day of admission he again developed fever and shortness of breath.     Does report fever but denies any shortness of breath, chest pain, cough. States he feels "fine" now.    Afebrile today. Denies chest pain, shortness of breath, abdominal symptoms. Asks when he can leave the hospital. We are awaiting repeat " COVID swab prior to discharge back to [facility] home.    Covid 19 -not detected  H&P 8/4/20                Hospital medicine PN 8/5/20        Hospital medicine PM 8/8/20          Lab 8/7/20       Provider, please further specify the pneumonia diagnosis:  Please clarify possible post-viral CAP diagnosis:    [    ] Bacterial, unspecified Pneumonia Ruled in    [    ] Unspecified Pneumonia Ruled in    [    ] Viral Pneumonia (please specify organism): _________, Ruled in    [    ] Other type of pneumonia (please specify): ________, Ruled in   [    ] Pneumonia Ruled Out    [ x   ] Clinically undetermined

## 2020-08-12 LAB
BACTERIA BLD CULT: NORMAL
BACTERIA BLD CULT: NORMAL

## 2020-08-24 ENCOUNTER — LAB VISIT (OUTPATIENT)
Dept: LAB | Facility: OTHER | Age: 60
End: 2020-08-24
Payer: MEDICARE

## 2020-08-24 DIAGNOSIS — Z03.818 ENCOUNTER FOR OBSERVATION FOR SUSPECTED EXPOSURE TO OTHER BIOLOGICAL AGENTS RULED OUT: ICD-10-CM

## 2020-08-24 PROCEDURE — U0003 INFECTIOUS AGENT DETECTION BY NUCLEIC ACID (DNA OR RNA); SEVERE ACUTE RESPIRATORY SYNDROME CORONAVIRUS 2 (SARS-COV-2) (CORONAVIRUS DISEASE [COVID-19]), AMPLIFIED PROBE TECHNIQUE, MAKING USE OF HIGH THROUGHPUT TECHNOLOGIES AS DESCRIBED BY CMS-2020-01-R: HCPCS

## 2020-08-25 LAB — SARS-COV-2 RNA RESP QL NAA+PROBE: NOT DETECTED

## 2020-12-31 ENCOUNTER — CLINICAL SUPPORT (OUTPATIENT)
Dept: URGENT CARE | Facility: CLINIC | Age: 60
End: 2020-12-31
Payer: MEDICARE

## 2020-12-31 DIAGNOSIS — Z20.822 ENCOUNTER FOR LABORATORY TESTING FOR COVID-19 VIRUS: Primary | ICD-10-CM

## 2020-12-31 LAB
CTP QC/QA: YES
SARS-COV-2 RDRP RESP QL NAA+PROBE: NEGATIVE

## 2020-12-31 PROCEDURE — U0002 COVID-19 LAB TEST NON-CDC: HCPCS | Mod: QW,CR,S$GLB, | Performed by: FAMILY MEDICINE

## 2020-12-31 PROCEDURE — U0002: ICD-10-PCS | Mod: QW,CR,S$GLB, | Performed by: FAMILY MEDICINE

## 2020-12-31 NOTE — PATIENT INSTRUCTIONS
CDC Testing and Quarantine Guidelines for patients with exposure to a known-positive COVID-19 person:  A close exposure is defined as anyone who has had an exposure (masked or unmasked) to a known COVID -19 positive person within 6 ft for longer than 15 minutes. If your exposure meets this definition you are required by CDC guidelines to quarantine for at least 7-10 days from time of exposure. The CDC states that a test can be performed for an asymptomatic patient (someone who does not have any symptoms) after a close exposure, and that a test should be done if you develop symptoms after a close exposure as described above.  Specifically, you can test at day 5 or later if asymptomatic, in order to get released from quarantine on day 7 or later.  If you develop symptoms sooner, you should test when your symptoms start.  If you meet the definition of a close exposure, it will not matter whether you are experiencing symptoms- a quarantine for at least 7-10 days after a close exposure is required by CDC guidelines.  Please note, if you decide to test as an asymptomatic during your quarantine and you are positive, you will be restarting your quarantine and moving from a possible 10 day quarantine (if you do not test), to a 11 day or greater quarantine.  The CDC also suggests people still monitor for symptoms for a full 14 days and remember that the shorter quarantine options do not replace initial CDC guidance.  The CDC continues to recommend quarantining for 14 days as the best way to reduce risk for spreading COVID-19 - however, this is only a recommendation.  If your exposure does not meet the above definition, you can return to your normal daily activities to include social distancing, wearing a mask and frequent handwashing.       NEGATIVE COVID TEST  You have tested negative for COVID-19 today.  If you did not have a close exposure (as defined below) you can return to your normal daily activities to include  "social distancing, wearing a mask and frequent handwashing.  A "close exposure" is defined as anyone who has had an exposure (masked or unmasked) to a known COVID -19 positive person within 6 ft for longer than 15 minutes. If your exposure meets this definition, you are required by CDC guidelines to quarantine for at least 7-10 days from time of exposure.  The CDC states that a test can be performed for an asymptomatic patient (someone who does not have any symptoms) after a close exposure, and that a test should be done if you develop symptoms after a close exposure as described above.  Specifically, you can test at day 5 or later if asymptomatic in order to get released from quarantine on day 7 or later.  If you develop symptoms sooner, you should test when your symptoms start.  If you developed symptoms since the exposure, and your test was negative today and less than 5 days from your exposure, you still have to quarantine for 7-10 days from the date of the exposure.  The 7-10 day quarantine begins from the day you were exposed, not the day of your test.  For example, if your exposure was on a Monday, and you waited until Friday of the same week to get tested and it was negative, your 7-10 day quarantine begins from that Monday, not the Friday you tested negative.  Please note, if you decide to test as an asymptomatic during your quarantine and you are positive, you will be restarting your quarantine and moving from a possible 10 day quarantine (if you do not test), to a 11 day or greater quarantine.         NEGATIVE COVID TEST  o You have tested negative for COVID-19 today.  If you did not have a close exposure (as defined below) you can return to your normal daily activities to include social distancing, wearing a mask and frequent handwashing.  o A "close exposure" is defined as anyone who has had an exposure (masked or unmasked) to a known COVID -19 positive person within 6 ft for longer than 15 minutes. If " your exposure meets this definition, you are required by CDC guidelines to quarantine for at least 7-10 days from time of exposure.  o The CDC states that a test can be performed for an asymptomatic patient (someone who does not have any symptoms) after a close exposure, and that a test should be done if you develop symptoms after a close exposure as described above.  o Specifically, you can test at day 5 or later if asymptomatic in order to get released from quarantine on day 7 or later.  If you develop symptoms sooner, you should test when your symptoms start.  o If you developed symptoms since the exposure, and your test was negative today and less than 5 days from your exposure, you still have to quarantine for 7-10 days from the date of the exposure.  o The 7-10 day quarantine begins from the day you were exposed, not the day of your test.  For example, if your exposure was on a Monday, and you waited until Friday of the same week to get tested and it was negative, your 7-10 day quarantine begins from that Monday, not the Friday you tested negative.  o Please note, if you decide to test as an asymptomatic during your quarantine and you are positive, you will be restarting your quarantine and moving from a possible 10 day quarantine (if you do not test), to a 11 day or greater quarantine.

## 2021-04-16 ENCOUNTER — PATIENT MESSAGE (OUTPATIENT)
Dept: RESEARCH | Facility: HOSPITAL | Age: 61
End: 2021-04-16

## 2021-07-01 ENCOUNTER — PATIENT MESSAGE (OUTPATIENT)
Dept: ADMINISTRATIVE | Facility: OTHER | Age: 61
End: 2021-07-01

## 2021-08-16 ENCOUNTER — LAB VISIT (OUTPATIENT)
Dept: PRIMARY CARE CLINIC | Facility: OTHER | Age: 61
End: 2021-08-16
Payer: MEDICARE

## 2021-08-16 DIAGNOSIS — R05.9 COUGH: ICD-10-CM

## 2021-08-16 PROCEDURE — U0003 INFECTIOUS AGENT DETECTION BY NUCLEIC ACID (DNA OR RNA); SEVERE ACUTE RESPIRATORY SYNDROME CORONAVIRUS 2 (SARS-COV-2) (CORONAVIRUS DISEASE [COVID-19]), AMPLIFIED PROBE TECHNIQUE, MAKING USE OF HIGH THROUGHPUT TECHNOLOGIES AS DESCRIBED BY CMS-2020-01-R: HCPCS | Performed by: INTERNAL MEDICINE

## 2021-08-17 LAB — SARS-COV-2- CYCLE NUMBER: 35.91

## 2021-08-18 DIAGNOSIS — U07.1 COVID-19 VIRUS DETECTED: ICD-10-CM

## 2021-08-18 LAB — SARS-COV-2 RNA RESP QL NAA+PROBE: DETECTED

## 2023-06-13 ENCOUNTER — CLINICAL SUPPORT (OUTPATIENT)
Dept: AUDIOLOGY | Facility: CLINIC | Age: 63
End: 2023-06-13
Payer: MEDICARE

## 2023-06-13 ENCOUNTER — OFFICE VISIT (OUTPATIENT)
Dept: OTOLARYNGOLOGY | Facility: CLINIC | Age: 63
End: 2023-06-13
Payer: MEDICARE

## 2023-06-13 DIAGNOSIS — H61.21 IMPACTED CERUMEN OF RIGHT EAR: Primary | ICD-10-CM

## 2023-06-13 DIAGNOSIS — H90.41 SENSORINEURAL HEARING LOSS (SNHL) OF RIGHT EAR WITH UNRESTRICTED HEARING OF LEFT EAR: ICD-10-CM

## 2023-06-13 DIAGNOSIS — H93.293 ABNORMAL AUDITORY PERCEPTION OF BOTH EARS: Primary | ICD-10-CM

## 2023-06-13 PROCEDURE — 99203 OFFICE O/P NEW LOW 30 MIN: CPT | Mod: 25,S$PBB,, | Performed by: NURSE PRACTITIONER

## 2023-06-13 PROCEDURE — 69210 REMOVE IMPACTED EAR WAX UNI: CPT | Mod: PBBFAC | Performed by: NURSE PRACTITIONER

## 2023-06-13 PROCEDURE — 69210 EAR CERUMEN REMOVAL: ICD-10-PCS | Mod: S$PBB,,, | Performed by: NURSE PRACTITIONER

## 2023-06-13 PROCEDURE — 99999 PR PBB SHADOW E&M-EST. PATIENT-LVL II: ICD-10-PCS | Mod: PBBFAC,,, | Performed by: NURSE PRACTITIONER

## 2023-06-13 PROCEDURE — 92552 PURE TONE AUDIOMETRY AIR: CPT | Mod: PBBFAC | Performed by: AUDIOLOGIST

## 2023-06-13 PROCEDURE — 99203 PR OFFICE/OUTPT VISIT, NEW, LEVL III, 30-44 MIN: ICD-10-PCS | Mod: 25,S$PBB,, | Performed by: NURSE PRACTITIONER

## 2023-06-13 PROCEDURE — 69210 REMOVE IMPACTED EAR WAX UNI: CPT | Mod: S$PBB,,, | Performed by: NURSE PRACTITIONER

## 2023-06-13 PROCEDURE — 99212 OFFICE O/P EST SF 10 MIN: CPT | Mod: PBBFAC | Performed by: NURSE PRACTITIONER

## 2023-06-13 PROCEDURE — 92555 SPEECH THRESHOLD AUDIOMETRY: CPT | Mod: PBBFAC | Performed by: AUDIOLOGIST

## 2023-06-13 PROCEDURE — 92567 TYMPANOMETRY: CPT | Mod: PBBFAC | Performed by: AUDIOLOGIST

## 2023-06-13 PROCEDURE — 99999 PR PBB SHADOW E&M-EST. PATIENT-LVL II: CPT | Mod: PBBFAC,,, | Performed by: NURSE PRACTITIONER

## 2023-06-13 NOTE — PROCEDURES
Ear Cerumen Removal    Date/Time: 6/13/2023 10:30 AM  Performed by: Destiny Simon NP  Authorized by: Destiny Simon NP       Local anesthetic:  None  Location details:  Right ear  Procedure type: curette    Cerumen  Removal Results:  Cerumen completely removed  Patient tolerance:  Patient tolerated the procedure well with no immediate complications     Procedure Note:    The patient was brought to the minor procedure room and placed under the operating microscope of the right ear canal which was cleaned of ceruminous debris. Using a combination of suction, curettes and cup forceps the patient's cerumen impaction was removed. The patient tolerated the procedure well. There were no complications.

## 2023-06-13 NOTE — PROGRESS NOTES
Subjective:   Ernie Davila is a 62 y.o. male who was self-referred for  annual ear check .    Ernie Davila presents to clinic with his caretaker Yakelin for his annual ear check with audiogram. Prior to audiometric testing today was noted to have a cerumen impaction. He has Down Syndrome. He denies any ear symptoms today including: trouble hearing, tinnitus, fullness, otalgia or otorrhea.    Past Medical History  He has a past medical history of Down syndrome, Hypothyroidism, Mental retardation, Multinodular goiter, and Myopia with astigmatism.    Past Surgical History  He has no past surgical history on file.    Family History  His family history includes Asthma in his brother.    Social History  He reports that he has never smoked. He does not have any smokeless tobacco history on file. He reports that he does not drink alcohol and does not use drugs.    Allergies  He has No Known Allergies.    Medications  He has a current medication list which includes the following prescription(s): ascorbic acid (vitamin c), calcium carbonate, levothyroxine, multivitamin, olopatadine, omeprazole, ranitidine, and tamsulosin.  Review of Systems     Constitutional: Negative for chills and fever.      HENT: Negative for ear discharge, ear infection, ear pain, hearing loss and ringing in the ears.      Respiratory:  Negative for cough and sleep apnea.        Objective:     Constitutional:   He is oriented to person, place, and time. He appears well-developed and well-nourished. He appears alert. He is cooperative.  Non-toxic appearance. He does not have a sickly appearance. He does not appear ill. Normal speech.      Head:  Normocephalic and atraumatic. Not macrocephalic and not microcephalic. Head is without raccoon's eyes, without Jacques's sign, without abrasion, without laceration, without right periorbital erythema, without left periorbital erythema and without TMJ tenderness.     Ears:    Right Ear: No lacerations. No  drainage, swelling or tenderness. No foreign bodies. No mastoid tenderness. Tympanic membrane is not injected, not scarred, not perforated, not erythematous, not retracted and not bulging. No middle ear effusion. No hemotympanum.   Left Ear: No lacerations. No drainage, swelling or tenderness. No foreign bodies. No mastoid tenderness. Tympanic membrane is not injected, not scarred, not perforated, not erythematous, not retracted and not bulging.  No middle ear effusion. No hemotympanum.   Significant ceruminous debris obstructing right TM removed under microscopy    Pulmonary/Chest:   Effort normal.     Psychiatric:   He has a normal mood and affect. His speech is normal and behavior is normal.     Neurological:   He is alert and oriented to person, place, and time.   Procedure  Cerumen removal performed.  See procedure note.    Audiogram      I independently reviewed the tracings of the complete audiometric evaluation performed today.  I reviewed the audiogram with the patient as well.  Pertinent findings include normal hearing in the right ear and mild HF SNHL in the left.   Assessment:     1. Impacted cerumen of right ear    2. Sensorineural hearing loss (SNHL) of right ear with unrestricted hearing of left ear      Plan:   Impacted cerumen of right ear  -     Ear Cerumen Removal    Sensorineural hearing loss (SNHL) of right ear with unrestricted hearing of left ear  Audiometric testing interpretation consistent with sensorineural hearing loss (mild HF in the left ear only). Hearing conservation in noisy environments. Return to clinic every year for audiometric testing.

## 2023-06-13 NOTE — PROGRESS NOTES
Mr. Ernie Davila was seen in the clinic today for an audiological evaluation.    Audiological testing revealed hearing in the normal to mild range for the right ear and hearing in the normal range for the left ear.  A speech reception threshold was obtained at 15 dBHL for the right ear and at 10 dBHL for the left ear.  Ernie fatigued during testing.      Tympanometry testing revealed a Type Ad tympanogram for the right ear and a Type A tympanogram for the left ear.      Recommendations:  1. Otologic evaluation  2. Annual audiological evaluation  3. Hearing protection when in noise

## 2024-01-29 ENCOUNTER — HOSPITAL ENCOUNTER (EMERGENCY)
Facility: HOSPITAL | Age: 64
Discharge: HOME | End: 2024-01-29
Attending: STUDENT IN AN ORGANIZED HEALTH CARE EDUCATION/TRAINING PROGRAM
Payer: MEDICARE

## 2024-01-29 VITALS
SYSTOLIC BLOOD PRESSURE: 103 MMHG | HEART RATE: 54 BPM | RESPIRATION RATE: 11 BRPM | OXYGEN SATURATION: 99 % | DIASTOLIC BLOOD PRESSURE: 57 MMHG

## 2024-01-29 DIAGNOSIS — R55 NEAR SYNCOPE: ICD-10-CM

## 2024-01-29 DIAGNOSIS — F43.0 STRESS REACTION: Primary | ICD-10-CM

## 2024-01-29 DIAGNOSIS — I26.99 ACUTE PULMONARY EMBOLISM WITHOUT ACUTE COR PULMONALE, UNSPECIFIED PULMONARY EMBOLISM TYPE: ICD-10-CM

## 2024-01-29 LAB
ALBUMIN SERPL BCP-MCNC: 3.3 G/DL (ref 3.5–5.2)
ALP SERPL-CCNC: 38 U/L (ref 55–135)
ALT SERPL W/O P-5'-P-CCNC: 17 U/L (ref 10–44)
ANION GAP SERPL CALC-SCNC: 5 MMOL/L (ref 8–16)
AST SERPL-CCNC: 22 U/L (ref 10–40)
BASOPHILS # BLD AUTO: 0.05 K/UL (ref 0–0.2)
BASOPHILS NFR BLD: 1.2 % (ref 0–1.9)
BILIRUB SERPL-MCNC: 0.5 MG/DL (ref 0.1–1)
BNP SERPL-MCNC: 45 PG/ML (ref 0–99)
BUN SERPL-MCNC: 18 MG/DL (ref 8–23)
CALCIUM SERPL-MCNC: 9.1 MG/DL (ref 8.7–10.5)
CHLORIDE SERPL-SCNC: 102 MMOL/L (ref 95–110)
CO2 SERPL-SCNC: 32 MMOL/L (ref 23–29)
CREAT SERPL-MCNC: 1.1 MG/DL (ref 0.5–1.4)
DIFFERENTIAL METHOD BLD: ABNORMAL
EOSINOPHIL # BLD AUTO: 0 K/UL (ref 0–0.5)
EOSINOPHIL NFR BLD: 0.9 % (ref 0–8)
ERYTHROCYTE [DISTWIDTH] IN BLOOD BY AUTOMATED COUNT: 15.2 % (ref 11.5–14.5)
EST. GFR  (NO RACE VARIABLE): >60 ML/MIN/1.73 M^2
GLUCOSE SERPL-MCNC: 92 MG/DL (ref 70–110)
HCT VFR BLD AUTO: 37 % (ref 40–54)
HGB BLD-MCNC: 12.8 G/DL (ref 14–18)
IMM GRANULOCYTES # BLD AUTO: 0.02 K/UL (ref 0–0.04)
IMM GRANULOCYTES NFR BLD AUTO: 0.5 % (ref 0–0.5)
LYMPHOCYTES # BLD AUTO: 1 K/UL (ref 1–4.8)
LYMPHOCYTES NFR BLD: 22.7 % (ref 18–48)
MCH RBC QN AUTO: 33 PG (ref 27–31)
MCHC RBC AUTO-ENTMCNC: 34.6 G/DL (ref 32–36)
MCV RBC AUTO: 95 FL (ref 82–98)
MONOCYTES # BLD AUTO: 0.5 K/UL (ref 0.3–1)
MONOCYTES NFR BLD: 10.4 % (ref 4–15)
NEUTROPHILS # BLD AUTO: 2.8 K/UL (ref 1.8–7.7)
NEUTROPHILS NFR BLD: 64.3 % (ref 38–73)
NRBC BLD-RTO: 0 /100 WBC
PLATELET # BLD AUTO: 169 K/UL (ref 150–450)
PMV BLD AUTO: 10.3 FL (ref 9.2–12.9)
POTASSIUM SERPL-SCNC: 4.4 MMOL/L (ref 3.5–5.1)
PROT SERPL-MCNC: 7.1 G/DL (ref 6–8.4)
RBC # BLD AUTO: 3.88 M/UL (ref 4.6–6.2)
SODIUM SERPL-SCNC: 139 MMOL/L (ref 136–145)
TROPONIN I SERPL DL<=0.01 NG/ML-MCNC: <0.006 NG/ML (ref 0–0.03)
WBC # BLD AUTO: 4.31 K/UL (ref 3.9–12.7)

## 2024-01-29 PROCEDURE — 99285 EMERGENCY DEPT VISIT HI MDM: CPT | Mod: 25

## 2024-01-29 PROCEDURE — 80053 COMPREHEN METABOLIC PANEL: CPT | Performed by: STUDENT IN AN ORGANIZED HEALTH CARE EDUCATION/TRAINING PROGRAM

## 2024-01-29 PROCEDURE — 83880 ASSAY OF NATRIURETIC PEPTIDE: CPT | Performed by: STUDENT IN AN ORGANIZED HEALTH CARE EDUCATION/TRAINING PROGRAM

## 2024-01-29 PROCEDURE — 84484 ASSAY OF TROPONIN QUANT: CPT | Performed by: STUDENT IN AN ORGANIZED HEALTH CARE EDUCATION/TRAINING PROGRAM

## 2024-01-29 PROCEDURE — 93010 ELECTROCARDIOGRAM REPORT: CPT | Mod: ,,, | Performed by: INTERNAL MEDICINE

## 2024-01-29 PROCEDURE — 85025 COMPLETE CBC W/AUTO DIFF WBC: CPT | Performed by: STUDENT IN AN ORGANIZED HEALTH CARE EDUCATION/TRAINING PROGRAM

## 2024-01-29 PROCEDURE — 93005 ELECTROCARDIOGRAM TRACING: CPT

## 2024-01-29 NOTE — ED PROVIDER NOTES
NAME:  Ernie Davila  CSN:     [id number]  MRN:    [id number]  ADMIT DATE: 1/29/2024        eMERGENCY dEPARTMENT eNCOUnter    CHIEF COMPLAINT    Chief Complaint   Patient presents with    Loss of Consciousness     Pt has hx of down syndrome, pt was at [facility], lives at group home and had syncopal episode in store with + urinary incontinence, care giver states pt is at [place], CBG= 118        HPI    Ernie Davila is a 63 y.o. male with a past medical history of  has a past medical history of Down syndrome, Hypothyroidism, Mental retardation, Multinodular goiter, and Myopia with astigmatism.     he presents to the ED due to evaluation after he had an episode at the [facility].  Apparently patient got very diaphoretic and Peed on himself multiple times.  Caregiver who was with him states he never lost consciousness or passed out.  She states she did not know that lab work typically stresses him out.  Notes that he was just getting routine lab work.  Patient without any complaints and caregiver feels he is at baseline.      He was at [facility]-near syncope, bladder incontinence. Did not pass out.     HPI       PAST MEDICAL HISTORY  Past Medical History:   Diagnosis Date    Down syndrome     Hypothyroidism     Mental retardation     Multinodular goiter     Myopia with astigmatism        SURGICAL HISTORY    No past surgical history on file.    FAMILY HISTORY    Family History   Problem Relation Age of Onset    Asthma Brother        SOCIAL HISTORY    Social History     Socioeconomic History    Marital status: Single   Tobacco Use    Smoking status: Never   Substance and Sexual Activity    Alcohol use: No    Drug use: No    Sexual activity: Never       MEDICATIONS  Current Outpatient Medications   Medication Instructions    ascorbic acid (vitamin C) (VITAMIN C) 500 mg, Oral, 2 times daily    calcium carbonate (OS-KARLI) 600 mg, Oral, 2 times daily with meals    levothyroxine (SYNTHROID) 75 mcg, Oral, Daily    multivitamin  (THERAGRAN) per tablet 1 tablet, Oral, Daily    olopatadine (PATANOL) 0.1 % ophthalmic solution 1 drop, 2 times daily    omeprazole (PRILOSEC) 20 mg, Oral, Daily    ranitidine (ZANTAC) 75 mg, Oral, 2 times daily PRN    tamsulosin (FLOMAX) 0.4 mg, Oral, Daily       ALLERGIES    Review of patient's allergies indicates:  No Known Allergies      REVIEW OF SYSTEMS   Review of Systems       PHYSICAL EXAM    Reviewed Triage Note    VITAL SIGNS:   ED Triage Vitals [01/29/24 1345]   Enc Vitals Group      /62      Pulse 62      Resp 16      Temp       Temp src       SpO2 98 %      Weight       Height       Head Circumference       Peak Flow       Pain Score       Pain Loc       Pain Edu?       Excl. in GC?        Patient Vitals for the past 24 hrs:   BP Pulse Resp SpO2   01/29/24 1345 114/62 62 16 98 %       Physical Exam    Nursing note and vitals reviewed.  Constitutional: He appears well-developed and well-nourished.   HENT:   Head: Normocephalic and atraumatic.   Right word nystagmus that is fatigable   Eyes: EOM are normal. Pupils are equal, round, and reactive to light.   Neck: Neck supple.   Normal range of motion.  Cardiovascular:  Normal rate and regular rhythm.           Pulmonary/Chest: Breath sounds normal. No respiratory distress.   Abdominal: Abdomen is soft. There is no abdominal tenderness.   Giggles during abdominal exam   Musculoskeletal:         General: Normal range of motion.      Cervical back: Normal range of motion and neck supple.     Neurological: He is alert and oriented to person, place, and time.   Skin: Skin is warm and dry.   Psychiatric: He has a normal mood and affect.   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  T-wave inversion noted in 3 and AVF.  New from previous.    ECG Results              EKG 12-lead (In process)  Result time 01/29/24 14:18:01      In process by  Interface, Lab In [facility] (01/29/24 14:18:01)                   Narrative:    Test Reason : R07.9,    Vent. Rate : 050 BPM     Atrial Rate : 050 BPM     P-R Int : 228 ms          QRS Dur : 096 ms      QT Int : 424 ms       P-R-T Axes : 081 102 -07 degrees     QTc Int : 386 ms    Sinus bradycardia with 1st degree A-V block  Incomplete right bundle branch block  Possible Right ventricular hypertrophy  T wave abnormality, consider inferior ischemia  Abnormal ECG  When compared with ECG of 06-AUG-2020 19:27,  Vent. rate has decreased BY  55 BPM  Incomplete right bundle branch block is now Present    Referred By: System System           Confirmed By:                                       LABS  Pertinent labs reviewed. (See chart for details)   Labs Reviewed   CBC W/ AUTO DIFFERENTIAL - Abnormal; Notable for the following components:       Result Value    RBC 3.88 (*)     Hemoglobin 12.8 (*)     Hematocrit 37.0 (*)     MCH 33.0 (*)     RDW 15.2 (*)     All other components within normal limits   COMPREHENSIVE METABOLIC PANEL - Abnormal; Notable for the following components:    CO2 32 (*)     Albumin 3.3 (*)     Alkaline Phosphatase 38 (*)     Anion Gap 5 (*)     All other components within normal limits   TROPONIN I   B-TYPE NATRIURETIC PEPTIDE         RADIOLOGY          Imaging Results              X-Ray Chest AP Portable (Final result)  Result time 01/29/24 14:40:25      Final result by Raoul Corbett MD (01/29/24 14:40:25)                   Impression:      No detrimental change or radiographic acute intrathoracic process seen on this single view.      Electronically signed by: Raoul Corbett MD  Date:    01/29/2024  Time:    14:40               Narrative:    EXAMINATION:  XR CHEST AP PORTABLE    CLINICAL HISTORY:  Chest Pain;    TECHNIQUE:  Single frontal view of the chest was performed.    COMPARISON:  Chest radiograph 08/07/2020, CT thorax 08/04/2020    FINDINGS:  Monitoring leads overlie the chest.  No detrimental  change.  Cardiomediastinal silhouette is midline and within normal limits for age, stable.  Pulmonary vasculature and hilar contours are within normal limits.  Improved aeration of the lung bases.  The lungs are symmetrically well expanded without consolidation, pleural effusion or pneumothorax noting similar mild bibasilar platelike scarring versus atelectasis.  No acute osseous process seen.  PA and lateral views can be obtained.                                        PROCEDURES    Procedures      ED COURSE & MEDICAL DECISION MAKING    Pertinent Labs & Imaging studies reviewed. (See chart for details and specific orders.)          Summary of review of records:       Medical Decision Making  Amount and/or Complexity of Data Reviewed  Labs: ordered.  Radiology: ordered.      Ernie Davila is a 63 y.o. male who lives at a group home presenting for abnormal behavior during lab draw today.  No true loss of consciousness.  Did not fall or l hit his head    Differential includes but is not limited to near syncope, doubt seizure doubt intracranial hemorrhage or trauma.          Medications - No data to display    ED Course as of 01/29/24 1606   Mon Jan 29, 2024   1547 Hemoglobin(!): 12.8  Chronic, stable [HL]   1600 Troponin I: <0.006  within normal limits  [HL]   1600 Comprehensive metabolic panel(!)  No acute abnormalities  [HL]   1600 BNP: 45  within normal limits  [HL]   1600 CBC auto differential(!)  within normal limits  [HL]   1601 X-Ray Chest AP Portable  No detrimental change or radiographic acute intrathoracic process seen on this single view.      [HL]      ED Course User Index  [HL] Kamla Choudhury DO       Workup here is reassuring.  Patient has been asymptomatic throughout.  Do think most likely secondary to stress of obtaining lab work earlier today.  His guarding at the group home is also requesting a Hematology referral, states that he has been on Eliquis for some time and would like to take him off of  it if possible.  It appears that he was placed on it in 2020 after found to have a blood clot in the setting of COVID-19.  Appears that initial plan may have been to discontinue after 90 days though his caregiver state this has not happened.  States that his primary care physician recently suggested he follow-up with a hematologist to better determine if this is appropriate and as such referral will be placed.  Patient stable at time of discharge and reasons to return discussed.      FINAL IMPRESSION    Final diagnoses:  [F43.0] Stress reaction (Primary)  [R55] Near syncope  [I26.99] Acute pulmonary embolism without acute cor pulmonale, unspecified pulmonary embolism type       DISPOSITION  Patient discharge in stable condition        ED Prescriptions    None       Follow-up Information       Follow up With Specialties Details Why Contact Info    Tobi Tavera MD Hematology and Oncology   [address] [facility] [address]  [address]  [facility] [address] [phone]                DISCLAIMER: This note was prepared with [employer] voice recognition transcription software. Garbled syntax, mangled pronouns, and other bizarre constructions may be attributed to that software system.             Kamla Choudhury, DO  01/29/24 1602

## 2024-01-30 ENCOUNTER — TELEPHONE (OUTPATIENT)
Dept: HEMATOLOGY/ONCOLOGY | Facility: CLINIC | Age: 64
End: 2024-01-30
Payer: MEDICARE

## 2024-02-22 ENCOUNTER — TELEPHONE (OUTPATIENT)
Dept: HEMATOLOGY/ONCOLOGY | Facility: CLINIC | Age: 64
End: 2024-02-22
Payer: MEDICARE

## 2024-02-22 NOTE — TELEPHONE ENCOUNTER
Left Vm for the pt to call the office to give a updated address. A appointment letter was sent out but mailed back to office due to vacant residence.

## 2024-03-04 ENCOUNTER — LAB VISIT (OUTPATIENT)
Dept: LAB | Facility: HOSPITAL | Age: 64
End: 2024-03-04
Attending: NURSE PRACTITIONER
Payer: MEDICARE

## 2024-03-04 ENCOUNTER — OFFICE VISIT (OUTPATIENT)
Dept: HEMATOLOGY/ONCOLOGY | Facility: CLINIC | Age: 64
End: 2024-03-04
Payer: MEDICARE

## 2024-03-04 VITALS
DIASTOLIC BLOOD PRESSURE: 64 MMHG | OXYGEN SATURATION: 93 % | BODY MASS INDEX: 23.19 KG/M2 | WEIGHT: 139.31 LBS | SYSTOLIC BLOOD PRESSURE: 108 MMHG | HEART RATE: 63 BPM

## 2024-03-04 DIAGNOSIS — I26.99 ACUTE PULMONARY EMBOLISM WITHOUT ACUTE COR PULMONALE, UNSPECIFIED PULMONARY EMBOLISM TYPE: ICD-10-CM

## 2024-03-04 PROCEDURE — 81241 F5 GENE: CPT | Performed by: NURSE PRACTITIONER

## 2024-03-04 PROCEDURE — 86146 BETA-2 GLYCOPROTEIN ANTIBODY: CPT | Performed by: NURSE PRACTITIONER

## 2024-03-04 PROCEDURE — 86147 CARDIOLIPIN ANTIBODY EA IG: CPT | Performed by: NURSE PRACTITIONER

## 2024-03-04 PROCEDURE — 99214 OFFICE O/P EST MOD 30 MIN: CPT | Mod: S$PBB,,, | Performed by: NURSE PRACTITIONER

## 2024-03-04 PROCEDURE — 85300 ANTITHROMBIN III ACTIVITY: CPT | Performed by: NURSE PRACTITIONER

## 2024-03-04 PROCEDURE — 36415 COLL VENOUS BLD VENIPUNCTURE: CPT | Performed by: NURSE PRACTITIONER

## 2024-03-04 PROCEDURE — 99214 OFFICE O/P EST MOD 30 MIN: CPT | Mod: PBBFAC,PO | Performed by: NURSE PRACTITIONER

## 2024-03-04 PROCEDURE — 85613 RUSSELL VIPER VENOM DILUTED: CPT | Performed by: NURSE PRACTITIONER

## 2024-03-04 PROCEDURE — 81240 F2 GENE: CPT | Performed by: NURSE PRACTITIONER

## 2024-03-04 PROCEDURE — 85305 CLOT INHIBIT PROT S TOTAL: CPT | Performed by: NURSE PRACTITIONER

## 2024-03-04 PROCEDURE — 85303 CLOT INHIBIT PROT C ACTIVITY: CPT | Performed by: NURSE PRACTITIONER

## 2024-03-04 PROCEDURE — 99999 PR PBB SHADOW E&M-EST. PATIENT-LVL IV: CPT | Mod: PBBFAC,,, | Performed by: NURSE PRACTITIONER

## 2024-03-04 RX ORDER — APIXABAN 5 MG/1
5 TABLET, FILM COATED ORAL 2 TIMES DAILY
COMMUNITY
Start: 2024-02-19

## 2024-03-04 NOTE — PROGRESS NOTES
"Patient ID: Ernie Davila is a 63 y.o. male.    Chief Complaint: PE history, chronic oral anticoagulation          History       HPI    Ernie Davila is a 63yr old male, new to Hem and this provider, referred for thrombotic workup, eliquis evaluation.    Pt presents with group home caregiver who states pt referred by ED provider for evaluation of eliquis. Pt with 8/4/20 diagnosis of PE "Pulmonary emboli involving the right main pulmonary artery, right lower lobe pulmonary arteries and left lower lobe subsegmental pulmonary arteries" with no evidence right heart strain.  Pt with covid diagnosis 7/16/20. No known prior history of PE. LE US negative for dvt 8/5/20. No known family history of PE or blood clotting disorders, there is a family history of brother with asthma noted in chart. Dr Strauss's hospital discharge summary 8/10/20 noted  "Will complete 90d course for COVID related PE".    Pt has been on eliquis since 8/2020. He is tolerating it well. No epistaxis, oral bleeding, hemoptysis, hematemesis, melena, hematuria, easy/frequent bruising. He denies HA, chest pain, abdominal pain, extremity pain.    Patient lives in a group home setting. 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           Past medical, surgical, and medication history, past family history reviewed and updated with patient today as noted.        Past Medical History:   Diagnosis Date    Down syndrome     Hypothyroidism     Mental retardation     Multinodular goiter     Myopia with astigmatism        History reviewed. No pertinent surgical history.    Oncology History:  Oncology History    No history exists.        Review of Systems:  Review of Systems   Constitutional:  Negative for fever.   HENT:  Negative for nosebleeds.    Respiratory:  Negative for cough.    Cardiovascular:  Negative for chest pain.   Gastrointestinal:  Negative for abdominal pain.   Genitourinary:  Negative for hematuria.   Musculoskeletal:  " Negative for back pain and leg pain.   Neurological:  Negative for headaches.          Physical Exam   Vitals:  /64   Pulse 63   Wt 63.2 kg (139 lb 5.3 oz)   SpO2 (!) 93%   BMI 23.19 kg/m²     Labs:  No visits with results within 2 Day(s) from this visit.   Latest known visit with results is:   Admission on 01/29/2024, Discharged on 01/29/2024   Component Date Value Ref Range Status    WBC 01/29/2024 4.31  3.90 - 12.70 K/uL Final    RBC 01/29/2024 3.88 (L)  4.60 - 6.20 M/uL Final    Hemoglobin 01/29/2024 12.8 (L)  14.0 - 18.0 g/dL Final    Hematocrit 01/29/2024 37.0 (L)  40.0 - 54.0 % Final    MCV 01/29/2024 95  82 - 98 fL Final    MCH 01/29/2024 33.0 (H)  27.0 - 31.0 pg Final    MCHC 01/29/2024 34.6  32.0 - 36.0 g/dL Final    RDW 01/29/2024 15.2 (H)  11.5 - 14.5 % Final    Platelets 01/29/2024 169  150 - 450 K/uL Final    MPV 01/29/2024 10.3  9.2 - 12.9 fL Final    Immature Granulocytes 01/29/2024 0.5  0.0 - 0.5 % Final    Gran # (ANC) 01/29/2024 2.8  1.8 - 7.7 K/uL Final    Immature Grans (Abs) 01/29/2024 0.02  0.00 - 0.04 K/uL Final    Comment: Mild elevation in immature granulocytes is non specific and   can be seen in a variety of conditions including stress response,   acute inflammation, trauma and pregnancy. Correlation with other   laboratory and clinical findings is essential.      Lymph # 01/29/2024 1.0  1.0 - 4.8 K/uL Final    Mono # 01/29/2024 0.5  0.3 - 1.0 K/uL Final    Eos # 01/29/2024 0.0  0.0 - 0.5 K/uL Final    Baso # 01/29/2024 0.05  0.00 - 0.20 K/uL Final    nRBC 01/29/2024 0  0 /100 WBC Final    Gran % 01/29/2024 64.3  38.0 - 73.0 % Final    Lymph % 01/29/2024 22.7  18.0 - 48.0 % Final    Mono % 01/29/2024 10.4  4.0 - 15.0 % Final    Eosinophil % 01/29/2024 0.9  0.0 - 8.0 % Final    Basophil % 01/29/2024 1.2  0.0 - 1.9 % Final    Differential Method 01/29/2024 Automated   Final    Sodium 01/29/2024 139  136 - 145 mmol/L Final    Potassium 01/29/2024 4.4  3.5 - 5.1 mmol/L Final     Chloride 01/29/2024 102  95 - 110 mmol/L Final    CO2 01/29/2024 32 (H)  23 - 29 mmol/L Final    Glucose 01/29/2024 92  70 - 110 mg/dL Final    BUN 01/29/2024 18  8 - 23 mg/dL Final    Creatinine 01/29/2024 1.1  0.5 - 1.4 mg/dL Final    Calcium 01/29/2024 9.1  8.7 - 10.5 mg/dL Final    Total Protein 01/29/2024 7.1  6.0 - 8.4 g/dL Final    Albumin 01/29/2024 3.3 (L)  3.5 - 5.2 g/dL Final    Total Bilirubin 01/29/2024 0.5  0.1 - 1.0 mg/dL Final    Comment: For infants and newborns, interpretation of results should be based  on gestational age, weight and in agreement with clinical  observations.    Premature Infant recommended reference ranges:  Up to 24 hours.............<8.0 mg/dL  Up to 48 hours............<12.0 mg/dL  3-5 days..................<15.0 mg/dL  6-29 days.................<15.0 mg/dL      Alkaline Phosphatase 01/29/2024 38 (L)  55 - 135 U/L Final    AST 01/29/2024 22  10 - 40 U/L Final    ALT 01/29/2024 17  10 - 44 U/L Final    eGFR 01/29/2024 >60  >60 mL/min/1.73 m^2 Final    Anion Gap 01/29/2024 5 (L)  8 - 16 mmol/L Final    Troponin I 01/29/2024 <0.006  0.000 - 0.026 ng/mL Final    Comment: The reference interval for Troponin I represents the 99th percentile   cutoff   for our facility and is consistent with 3rd generation assay   performance.      BNP 01/29/2024 45  0 - 99 pg/mL Final    Values of less than 100 pg/ml are consistent with non-CHF populations.        Physical Exam:  Physical Exam  Vitals and nursing note reviewed.   Constitutional:       General: He is not in acute distress.     Appearance: He is not ill-appearing, toxic-appearing or diaphoretic.   HENT:      Head: Normocephalic and atraumatic.      Nose: Nose normal.   Eyes:      General: No scleral icterus.     Pupils: Pupils are equal, round, and reactive to light.   Cardiovascular:      Rate and Rhythm: Normal rate and regular rhythm.      Pulses: Normal pulses.      Heart sounds: Normal heart sounds. No murmur heard.  Pulmonary:     "  Effort: Pulmonary effort is normal. No respiratory distress.      Breath sounds: Normal breath sounds.   Abdominal:      General: Bowel sounds are normal. There is no distension.      Palpations: Abdomen is soft.      Tenderness: There is no abdominal tenderness. There is no guarding.   Musculoskeletal:      Cervical back: Normal range of motion and neck supple.      Right lower leg: No edema.      Left lower leg: No edema.   Skin:     General: Skin is warm and dry.      Findings: No bruising.   Neurological:      Mental Status: He is alert.      Gait: Gait normal.   Psychiatric:         Behavior: Behavior is cooperative.      Comments: Cooperative, recurrently talking about his girlfriend Lorie          ECOG:   ECOG SCORE    1 - Restricted in strenuous activity-ambulatory and able to carry out work of a light nature            PROCEDURES/IMAGING    US BLE 8/5/20  Impression:     No evidence of deep venous thrombosis in either lower extremity.         CTA 8/4/20  Impression:     Pulmonary emboli involving the right main pulmonary artery, right lower lobe pulmonary arteries and left lower lobe subsegmental pulmonary arteries.  No evidence right heart strain.     Constellation pulmonary findings may be due to multifocal infection, aspiration and/or developing infarct.           Discussion     Problem List:  Problem List Items Addressed This Visit          Hematology    Acute pulmonary embolism without acute cor pulmonale    Relevant Orders    Beta-2 Glycoprotein Abs (IgA, IgG, IgM)    Cardiolipin antibody    Factor 5 leiden    Protein C Activity    Protein C Antigen, Total    Protein S Antigen, Total    Protein S Activity    Prothrombin [id number] Mutation    Antithrombin III    DRVVT        PE history, chronic AOC  - on eliquis since 8/2020 PE diagnosis  - Dr Strauss's hospital discharge summary 8/10/20 noted  "Will complete 90d course for COVID related PE".  - referred for thrombotic evaluation  - pt is active at " group home, has a job, nonsmoker, no obesity; family history is limited although chart notes brother with asthma history  - will complete labs today, repeat 12 wks and rtc 14 wks to discuss AOC usage            AXEL HarrisonC  [facility]  Hematology/Oncology  [address] [place] [address]  FRANK Davies  [phone]

## 2024-03-06 LAB
PROT C ACT/NOR PPP CHRO: 94 % (ref 70–150)
PROT S ACT/NOR PPP: 99 % (ref 65–150)

## 2024-03-07 LAB
AT III ACT/NOR PPP CHRO: 103 % (ref 83–118)
B2 GLYCOPROT1 IGA SER QL: 1.5 U/ML
B2 GLYCOPROT1 IGG SER QL: 1.6 U/ML
B2 GLYCOPROT1 IGM SER QL: <2.4 U/ML
CARDIOLIPIN IGG SER IA-ACNC: <9.4 GPL (ref 0–14.99)
CARDIOLIPIN IGM SER IA-ACNC: <9.4 MPL (ref 0–12.49)
CONFIRM DRVVT STA-STACLOT: NORMAL S
DRVVT SCREEN TO CONFIRM RATIO: NORMAL {RATIO}
HEPARIN NT PPP QL: NORMAL
LA 3 SCREEN W REFLEX-IMP: NORMAL
LMW HEPARIN IND PLT AB SER QL: NORMAL
MIXING DRVVT/NORMAL: NORMAL %
NEUTRALIZED DRVVT SCREEN RATIO: NORMAL
PROT S AG ACT/NOR PPP IA: 78 % (ref 50–140)
PROTHROMBIN TIME: 14.1 S (ref 12–15.5)
SCREEN APTT/NORMAL: 1.07
SCREEN APTT/NORMAL: NORMAL
SCREEN DRVVT/NORMAL: 1 %
THROMBIN TIME: NORMAL S

## 2024-03-08 LAB
F2 C.20210G>A GENO BLD/T: NEGATIVE
F5 P.R506Q BLD/T QL: NEGATIVE
PROT C AG ACT/NOR PPP IA: 95 % (ref 72–160)

## 2024-03-11 ENCOUNTER — TELEPHONE (OUTPATIENT)
Dept: HEMATOLOGY/ONCOLOGY | Facility: CLINIC | Age: 64
End: 2024-03-11
Payer: MEDICARE

## 2024-03-11 ENCOUNTER — PATIENT MESSAGE (OUTPATIENT)
Dept: HEMATOLOGY/ONCOLOGY | Facility: CLINIC | Age: 64
End: 2024-03-11
Payer: MEDICARE

## 2024-03-11 NOTE — TELEPHONE ENCOUNTER
----- Message from Shahrzad Rangel NP sent at 3/11/2024 11:20 AM CDT -----  Call and speak with nurse (if there is a nurse at group home or the person deemed over client's medical needs) that thus far labs good. Keep next lab and clinic follow up appt as we scheduled the other day for repeat of these labs and to discuss results.    Shahrzad    ----- Message -----  From: [employer]  Sent: 3/6/2024   9:00 AM CDT  To: Shahrzad Rangel NP

## 2024-03-11 NOTE — TELEPHONE ENCOUNTER
Called pt HPA back to let him know Mrs. Rangel believed blood clot likely due to covid. The bloodwork I am doing is to ensure he does not have a condition that would make him more at risk for blood clots. If workup negative, I will stop the Eliquis. HPA understood and agreed.

## 2024-06-07 ENCOUNTER — TELEPHONE (OUTPATIENT)
Dept: HEMATOLOGY/ONCOLOGY | Facility: CLINIC | Age: 64
End: 2024-06-07
Payer: MEDICARE

## 2025-02-19 ENCOUNTER — HOSPITAL ENCOUNTER (EMERGENCY)
Facility: HOSPITAL | Age: 65
Discharge: HOME | End: 2025-02-19
Attending: EMERGENCY MEDICINE
Payer: MEDICARE

## 2025-02-19 VITALS
HEIGHT: 65 IN | RESPIRATION RATE: 20 BRPM | DIASTOLIC BLOOD PRESSURE: 74 MMHG | BODY MASS INDEX: 23.16 KG/M2 | TEMPERATURE: 98 F | SYSTOLIC BLOOD PRESSURE: 116 MMHG | HEART RATE: 60 BPM | WEIGHT: 139 LBS | OXYGEN SATURATION: 100 %

## 2025-02-19 DIAGNOSIS — Z04.9 CONDITION NOT FOUND: Primary | ICD-10-CM

## 2025-02-19 PROCEDURE — 99283 EMERGENCY DEPT VISIT LOW MDM: CPT

## 2025-02-19 NOTE — DISCHARGE INSTRUCTIONS
Thank you for allowing me and my emergency team to take care of you here today! I hope you feel better soon. Please do not hesitate to return with any additional concerns that may arise from this or any new problem you encounter.    Our goal in the emergency department is to always give you outstanding care and exceptional service. If you receive a survey by mail or e-mail in the next week regarding your experience in our ED, we would greatly appreciate you completing it. Your feedback provides us with a way to recognize our staff who give very good care and it helps us learn how to improve when your experience was below the excellence we aspire to be!    Brook Juneau, PA-C [facility]   Emergency Room Physician Assistant

## 2025-02-19 NOTE — ED PROVIDER NOTES
Encounter Date: 2/19/2025       History     Chief Complaint   Patient presents with    Hypertension     Nursing staff called  EMS for reported HTN. EMS reports normotensive, 108/64. Patient denies acute complaints at this time.     Patient is a 64-year-old male with a past medical history of Down syndrome, hypothyroidism, mental retardation, and myopia with astigmatism who presents to emergency room for questionable hypertension.  Patient is currently group home.  Nursing staff called EMS for reported hypertension.  They states that he had blood pressure of 235 systolic and 178 on repeat.  Patient without any complaints.  Upon EMS arrival, patient was normotensive, 108/64.  Patient denies any pain at this time.  Family member at bedside states that patient does not take blood pressure medications and has not had high blood pressure in the past.    The history is provided by the EMS personnel and a relative. No  was used.     Review of patient's allergies indicates:  No Known Allergies  Past Medical History:   Diagnosis Date    Down syndrome     Hypothyroidism     Mental retardation     Multinodular goiter     Myopia with astigmatism      No past surgical history on file.  Family History   Problem Relation Name Age of Onset    Asthma Brother       Social History[1]  Review of Systems   Unable to perform ROS: Other   Patient with history of mental retardation, limited communication    Physical Exam     Initial Vitals [02/19/25 1021]   BP Pulse Resp Temp SpO2   108/63 60 20 98 °F (36.7 °C) 100 %      MAP       --         Physical Exam    Nursing note and vitals reviewed.  Constitutional: He appears well-developed and well-nourished. He is not diaphoretic. No distress.   HENT:   Right Ear: External ear normal.   Left Ear: External ear normal.   Eyes: Conjunctivae are normal.   Neck: Neck supple.   Normal range of motion.  Pulmonary/Chest: No respiratory distress.   Abdominal: Abdomen is soft. He  exhibits no distension. There is no abdominal tenderness. There is no rebound and no guarding.   Musculoskeletal:         General: No tenderness or edema. Normal range of motion.      Cervical back: Normal range of motion and neck supple.     Neurological: He is alert.   Skin: Skin is warm.   Psychiatric: He has a normal mood and affect.         ED Course   Procedures  Labs Reviewed - No data to display       Imaging Results    None          Medications - No data to display  Medical Decision Making  Patient presents to emergency room for possible hypertension.  Vital signs stable and within normal limits.  Blood pressure of 108/63.  Physical exam as stated above.    Differential Diagnosis includes, but is not limited to hypertensive encephalopathy, CVA/TIA, intracranial hemorrhage, MI/ACS, aortic/carotid artery dissection, AAA, hypertensive nephropathy, medication non-compliance, anxiety, drug abuse/intoxication, or essential hypertension.  Upon arrival to the emergency room, patient is normotensive.  Multiple blood pressures were taken without evidence of hypertension.  I do not think the patient's presentation necessitates further intervention from what has been performed in the Emergency Department. I have given the patient and/or their family specific return precautions and instructions to follow up with their regular doctor.     I see no indication of an emergent process beyond that addressed during our encounter. Patient stable for discharge at this time. I have counseled the relative regarding follow up with primary care and gave strict return precautions. I have discussed the final diagnosis and gave instructions regarding home medications.  Relative verbalized understanding and is agreeable.     Problems Addressed:  Condition not found: acute illness or injury    Amount and/or Complexity of Data Reviewed  Independent Historian: caregiver     Details: Relative at bedside with the patient.  External Data  Reviewed: notes.     Details: Patient last seen by Heme-Onc in 03/2024.  Labs:      Details: Considered ordering.  However, patient without any high blood pressure at this time.  No complaints.  ECG/medicine tests:      Details: Considered ordering EKG, though patient without any chest pain, palpitations, leg swelling, or SOB at this time.     Risk  Diagnosis or treatment significantly limited by social determinants of health.  Risk Details: Comorbidities taken into consideration during the patient's evaluation and treatment include down syndrome, hypothyroidism, mental retardation, and myopia.    Social determinants of health taken into consideration during development of our treatment plan include difficulty in obtaining follow-up, obtaining medications, health literacy, access to healthy options for preventative/conservative management, and/or support systems due to, but not limited to, transportation limitations, socioeconomic status, and environmental factors.                                       Clinical Impression:  Final diagnoses:  [Z04.9] Condition not found (Primary)          ED Disposition Condition    Discharge Stable          ED Prescriptions    None       Follow-up Information       Follow up With Specialties Details Why Contact Info    Your doctor  Schedule an appointment as soon as possible for a visit       [facility] Emergency Dept Emergency Medicine Go to  If new or worsening symptoms occur [address] [place] [address] [id number] [phone]          This note was partially created using [employer] Voice Recognition software. Typographical and content errors may occur with this process. While efforts are made to detect and correct such errors, in some cases errors will persist. For this reason, wording in this document should be considered in the proper context and not strictly verbatim.          [1]   Social History  Tobacco Use    Smoking status: Never   Substance Use Topics     Alcohol use: No    Drug use: No        Ursula Martinez PA-C  02/19/25 1613

## 2025-02-19 NOTE — ED NOTES
Pt arrives with caretaker from group home for HTN. On arrival to ED, VSS, pt denies any pain. Is alert.     APPEARANCE: Alert, oriented x 4, and in no acute distress. Appears well nourished, clean, and kempt.  NEURO: 5/5 strength major flexors/extensors bilaterally. Sensory intact to light touch bilaterally. Sarah coma scale: eyes open spontaneously-4, oriented & converses-5, obeys commands-6. No neurological abnormalities. Denies HA, dizziness, photophobia.  CARDIAC: Normal rate, S1 and S2 noted, denies CP.   RESPIRATORY:Normal rate and effort, breath sounds clear bilaterally throughout chest anterior and posterior. Respirations are equal and unlabored, no obvious signs of distress. No SOB reported.  PERIPHERAL VASCULAR: +2 peripheral pulses present. Normal cap refill <3sec. No edema. Warm to touch.   GASTRO: Abdomen soft, flat, bowel sounds normal and active in all 4 quadrants, no tenderness, no abdominal distention. Denies NVD.  MUSC: Full ROM. No bony tenderness or soft tissue tenderness. No obvious deformity. Ambulatory.  SKIN: Skin is warm and dry, normal skin turgor, mucous membranes moist.   GENITOURINARY: Voids spontaneously, denies itching, burning, hematuria.

## 2025-06-12 NOTE — PLAN OF CARE
Spoke to mr irby   Asked if mr dang had received a vaccination for pneumonia in the past.   Response was he did not think so.   Mr irby gave permission for vaccination.       On further review of Mr dang current febrile condition in past 24 hours, pt no longer meets criteria.    Mr irby notified a vaccination would not be given    No